# Patient Record
Sex: FEMALE | Race: WHITE | NOT HISPANIC OR LATINO | URBAN - METROPOLITAN AREA
[De-identification: names, ages, dates, MRNs, and addresses within clinical notes are randomized per-mention and may not be internally consistent; named-entity substitution may affect disease eponyms.]

---

## 2021-04-28 ENCOUNTER — PATIENT MESSAGE (OUTPATIENT)
Dept: RESEARCH | Facility: HOSPITAL | Age: 66
End: 2021-04-28

## 2023-05-03 ENCOUNTER — PATIENT MESSAGE (OUTPATIENT)
Dept: RESEARCH | Facility: HOSPITAL | Age: 68
End: 2023-05-03

## 2024-04-16 ENCOUNTER — TELEPHONE (OUTPATIENT)
Dept: NEUROLOGY | Facility: CLINIC | Age: 69
End: 2024-04-16

## 2024-04-16 NOTE — TELEPHONE ENCOUNTER
----- Message from Rebecca Barajas sent at 4/16/2024  4:34 PM CDT -----  Type: Appointment Request     Name of Caller: Casey (son)    When is the first available appointment? Do not have access    Reason for Visit:  Parkinson...  New pt     Best Call Back Number: [phone]    Additional Information:

## 2024-04-17 NOTE — TELEPHONE ENCOUNTER
This patient was seen in [place], [place], and [place].  I think they want to come and discuss DBS. PRISCILLA Rodriguez

## 2024-04-18 ENCOUNTER — TELEPHONE (OUTPATIENT)
Dept: NEUROLOGY | Facility: CLINIC | Age: 69
End: 2024-04-18

## 2024-06-05 ENCOUNTER — TELEPHONE (OUTPATIENT)
Dept: NEUROLOGY | Facility: CLINIC | Age: 69
End: 2024-06-05

## 2024-06-05 NOTE — TELEPHONE ENCOUNTER
----- Message from Suzan Manley sent at 4/17/2024 10:13 AM CDT -----  Regarding: sooner appt  Dr Sunita Tellez is referring this pt to see you for a appt sooner than June Dx dizziness/abnormal image    Plz ctc pt to schedule appt.     The referral/records in media     Thank you,  Suzan Manley   [facility]

## 2024-07-19 ENCOUNTER — TELEPHONE (OUTPATIENT)
Dept: NEUROLOGY | Facility: CLINIC | Age: 69
End: 2024-07-19
Payer: COMMERCIAL

## 2024-07-19 NOTE — TELEPHONE ENCOUNTER
----- Message from Vi Estrada MD sent at 7/18/2024 11:12 AM CDT -----  Regarding: RE: referral from Zachary young  I'd love to see her as a virtual initially and let her keep the OCT date  Can se manage that?  ----- Message -----  From: Vi Ramirez RN  Sent: 7/17/2024   4:50 PM CDT  To: Vi Estrada MD; Natalie Lebron Staff  Subject: referral from Zachary young                      Hi Dr Estrada,    We got a message from the system /  of [facility] [place] [place] asking to get this pt in sooner. She is currently booked October 1.    Can we talk about getting her seen sooner than later? Would you be able to add her on one day in the next couple weeks?    Thanks,    Vi

## 2024-07-22 ENCOUNTER — TELEPHONE (OUTPATIENT)
Dept: NEUROLOGY | Facility: CLINIC | Age: 69
End: 2024-07-22
Payer: COMMERCIAL

## 2024-07-22 NOTE — TELEPHONE ENCOUNTER
----- Message from Vi Ramirez RN sent at 7/19/2024 10:15 AM CDT -----  Regarding: RE: referral from Zachary quinner  Please let me know when you schedule/speak with pt's dtrSvitlana, [phone].     Thanks so much!  ----- Message -----  From: Jennifer López RN  Sent: 7/19/2024   9:44 AM CDT  To: Vi Ramirez RN  Subject: RE: referral from Zachary young                  Thank you  ----- Message -----  From: Vi Ramirez RN  Sent: 7/19/2024   9:04 AM CDT  To: Jennifer López RN  Subject: FW: referral from Zachary young                  Good morningJennifer,    Here is the VIP that needs the virtual ASAP.    Thanks,    Vi  ----- Message -----  From: Vi Estrada MD  Sent: 7/18/2024  11:13 AM CDT  To: Vi Ramirez RN  Subject: RE: referral from Zachary young                  I'd love to see her as a virtual initially and let her keep the OCT date  Can se manage that?  ----- Message -----  From: Vi Ramirez RN  Sent: 7/17/2024   4:50 PM CDT  To: Vi Estrada MD; Natalie Lebron Staff  Subject: referral from Zachary young                      Hi Dr Estrada,    We got a message from the system /  of [facility] in sooner. She is currently booked October 1.    Can we talk about getting her seen sooner than later? Would you be able to add her on one day in the next couple weeks?    Thanks,    Vi

## 2024-07-22 NOTE — TELEPHONE ENCOUNTER
Called daughter, Svitlana regarding PD care for Allie.  Informed her she is scheduled for August 8th at 3 pm    She reportes Allie was evaluated at [facility] for DBS They told her the memory issues prohibited her from getting DBS.    Uploaded records from Care Everywhere.    Svitlana reports she is taking Rytary every two hours. On 17 meds or more.  Would like memory evaluation. Was taking a memory pill, but had to stop due to diarrhea    Taking anxiety meds as well.   Started with  In [place] but was dropped when she went to [place].    Saw doctor in [place] at  most recently.    All facilities have Epic

## 2024-07-22 NOTE — TELEPHONE ENCOUNTER
Attempted to call pt's daughter at [phone]. Left message with request to return call. Informed her that appointment is Aug 8 at 3 pm.

## 2024-07-22 NOTE — TELEPHONE ENCOUNTER
----- Message from Vi Ramirez RN sent at 7/19/2024 10:15 AM CDT -----  Regarding: RE: referral from Zachary quinner  Please let me know when you schedule/speak with pt's dtrSvitlana, [phone].     Thanks so much!  ----- Message -----  From: Jennifer López RN  Sent: 7/19/2024   9:44 AM CDT  To: Vi Ramirez RN  Subject: RE: referral from Zachary young                  Thank you  ----- Message -----  From: Vi Ramirez RN  Sent: 7/19/2024   9:04 AM CDT  To: Jennifer López RN  Subject: FW: referral from Zachary young                  Good morningJennifer,    Here is the VIP that needs the virtual ASAP.    Thanks,    Vi  ----- Message -----  From: Vi Estrada MD  Sent: 7/18/2024  11:13 AM CDT  To: Vi Ramirez RN  Subject: RE: referral from Zachary young                  I'd love to see her as a virtual initially and let her keep the OCT date  Can se manage that?  ----- Message -----  From: Vi Ramirez RN  Sent: 7/17/2024   4:50 PM CDT  To: Vi Estrada MD; Natalie Lebron Staff  Subject: referral from Zachary young                      Hi Dr Estrada,    We got a message from the system /  of [facility] asking to get this pt in sooner. She is currently booked October 1.    Can we talk about getting her seen sooner than later? Would you be able to add her on one day in the next couple weeks?    Thanks,    Vi

## 2024-08-08 ENCOUNTER — OFFICE VISIT (OUTPATIENT)
Dept: NEUROLOGY | Facility: CLINIC | Age: 69
End: 2024-08-08
Payer: COMMERCIAL

## 2024-08-08 ENCOUNTER — LAB VISIT (OUTPATIENT)
Dept: LAB | Facility: HOSPITAL | Age: 69
End: 2024-08-08
Attending: PSYCHIATRY & NEUROLOGY
Payer: MEDICARE

## 2024-08-08 DIAGNOSIS — R41.3 OTHER AMNESIA: ICD-10-CM

## 2024-08-08 DIAGNOSIS — R41.3 MEMORY CHANGE: ICD-10-CM

## 2024-08-08 DIAGNOSIS — R41.3 MEMORY CHANGE: Primary | ICD-10-CM

## 2024-08-08 DIAGNOSIS — G20.B2 PARKINSON'S DISEASE WITH DYSKINESIA AND FLUCTUATING MANIFESTATIONS: ICD-10-CM

## 2024-08-08 LAB
CREAT SERPL-MCNC: 0.7 MG/DL (ref 0.5–1.4)
EST. GFR  (NO RACE VARIABLE): >60 ML/MIN/1.73 M^2
TSH SERPL DL<=0.005 MIU/L-ACNC: 0.51 UIU/ML (ref 0.4–4)

## 2024-08-08 PROCEDURE — 82607 VITAMIN B-12: CPT | Performed by: PSYCHIATRY & NEUROLOGY

## 2024-08-08 PROCEDURE — 82565 ASSAY OF CREATININE: CPT | Performed by: PSYCHIATRY & NEUROLOGY

## 2024-08-08 PROCEDURE — 84443 ASSAY THYROID STIM HORMONE: CPT | Performed by: PSYCHIATRY & NEUROLOGY

## 2024-08-08 PROCEDURE — 99999 PR PBB SHADOW E&M-EST. PATIENT-LVL III: CPT | Mod: PBBFAC,,, | Performed by: PSYCHIATRY & NEUROLOGY

## 2024-08-08 PROCEDURE — 99205 OFFICE O/P NEW HI 60 MIN: CPT | Mod: S$GLB,,, | Performed by: PSYCHIATRY & NEUROLOGY

## 2024-08-08 PROCEDURE — 84425 ASSAY OF VITAMIN B-1: CPT | Performed by: PSYCHIATRY & NEUROLOGY

## 2024-08-08 PROCEDURE — 86592 SYPHILIS TEST NON-TREP QUAL: CPT | Performed by: PSYCHIATRY & NEUROLOGY

## 2024-08-08 PROCEDURE — 1159F MED LIST DOCD IN RCRD: CPT | Mod: CPTII,S$GLB,, | Performed by: PSYCHIATRY & NEUROLOGY

## 2024-08-08 RX ORDER — GABAPENTIN 100 MG/1
100 CAPSULE ORAL 3 TIMES DAILY
COMMUNITY
Start: 2024-05-08

## 2024-08-08 RX ORDER — VENLAFAXINE HYDROCHLORIDE 150 MG/1
150 CAPSULE, EXTENDED RELEASE ORAL DAILY
COMMUNITY
Start: 2024-08-05

## 2024-08-08 RX ORDER — LEVODOPA AND CARBIDOPA 145; 36.25 MG/1; MG/1
1 CAPSULE, EXTENDED RELEASE ORAL DAILY
Qty: 90 CAPSULE | Refills: 12 | Status: SHIPPED | OUTPATIENT
Start: 2024-08-08

## 2024-08-08 RX ORDER — METOPROLOL SUCCINATE 50 MG/1
1 TABLET, EXTENDED RELEASE ORAL DAILY
COMMUNITY
Start: 2024-01-25

## 2024-08-08 RX ORDER — POLYETHYLENE GLYCOL 3350 17 G/17G
17 POWDER, FOR SOLUTION ORAL DAILY
COMMUNITY

## 2024-08-08 RX ORDER — BACLOFEN 5 MG/1
5 TABLET ORAL 3 TIMES DAILY
Qty: 270 TABLET | Refills: 0 | Status: SHIPPED | OUTPATIENT
Start: 2024-08-08 | End: 2024-11-06

## 2024-08-08 RX ORDER — MIRTAZAPINE 15 MG/1
15 TABLET, FILM COATED ORAL NIGHTLY
COMMUNITY

## 2024-08-08 RX ORDER — ACETAMINOPHEN 500 MG
1 TABLET ORAL EVERY MORNING
COMMUNITY

## 2024-08-08 RX ORDER — CELECOXIB 100 MG/1
100 CAPSULE ORAL 2 TIMES DAILY
COMMUNITY

## 2024-08-08 RX ORDER — HYDROCODONE BITARTRATE AND ACETAMINOPHEN 5; 325 MG/1; MG/1
1 TABLET ORAL EVERY 12 HOURS PRN
COMMUNITY

## 2024-08-08 RX ORDER — ACETAMINOPHEN 500 MG
50000 TABLET ORAL DAILY
COMMUNITY

## 2024-08-08 RX ORDER — AMANTADINE HYDROCHLORIDE 100 MG/1
100 TABLET ORAL DAILY
Qty: 30 TABLET | Refills: 11 | Status: SHIPPED | OUTPATIENT
Start: 2024-08-08 | End: 2025-08-08

## 2024-08-08 RX ORDER — LEVODOPA AND CARBIDOPA 95; 23.75 MG/1; MG/1
2 CAPSULE, EXTENDED RELEASE ORAL
Qty: 1080 CAPSULE | Refills: 12 | Status: SHIPPED | OUTPATIENT
Start: 2024-08-08

## 2024-08-08 RX ORDER — LORAZEPAM 0.5 MG/1
1 TABLET ORAL NIGHTLY
COMMUNITY
Start: 2024-07-22

## 2024-08-08 RX ORDER — LUBIPROSTONE 24 UG/1
24 CAPSULE ORAL 2 TIMES DAILY WITH MEALS
COMMUNITY

## 2024-08-08 RX ORDER — AMLODIPINE BESYLATE 2.5 MG/1
2.5 TABLET ORAL DAILY
COMMUNITY
Start: 2024-06-13

## 2024-08-08 RX ORDER — ACETAMINOPHEN 500 MG
5000 TABLET ORAL DAILY
COMMUNITY

## 2024-08-09 LAB — RPR SER QL: NORMAL

## 2024-08-12 ENCOUNTER — TELEPHONE (OUTPATIENT)
Dept: NEUROLOGY | Facility: CLINIC | Age: 69
End: 2024-08-12
Payer: MEDICARE

## 2024-08-12 LAB — VIT B12 SERPL-MCNC: 486 NG/L (ref 180–914)

## 2024-08-12 NOTE — TELEPHONE ENCOUNTER
----- Message from Leland Bliss Jr., MA sent at 8/9/2024  1:17 PM CDT -----  Regarding: FW: Refill  Contact: [phone]    ----- Message -----  From: Emma Brooke  Sent: 8/9/2024  11:54 AM CDT  To: Natalie Lebron Staff  Subject: Refill                                           [facility] calling in reference to medication: amantadine  mg Tab. Pharmacy would like to know if they can switch to the capsules instead of tablets. Please give pharmacy a call.     [facility] - [place], [place] - [address]  [address] [address]  Phone: [phone] Fax: [phone]

## 2024-08-14 LAB — VIT B1 BLD-MCNC: 68 UG/L (ref 38–122)

## 2024-08-20 ENCOUNTER — PATIENT MESSAGE (OUTPATIENT)
Dept: NEUROLOGY | Facility: CLINIC | Age: 69
End: 2024-08-20
Payer: MEDICARE

## 2024-08-29 ENCOUNTER — PATIENT MESSAGE (OUTPATIENT)
Dept: NEUROLOGY | Facility: CLINIC | Age: 69
End: 2024-08-29
Payer: MEDICARE

## 2024-08-29 DIAGNOSIS — F41.8 TEST ANXIETY: ICD-10-CM

## 2024-08-29 DIAGNOSIS — G20.B2 PARKINSON'S DISEASE WITH DYSKINESIA AND FLUCTUATING MANIFESTATIONS: Primary | ICD-10-CM

## 2024-08-30 RX ORDER — RIVASTIGMINE TARTRATE 1.5 MG/1
1.5 CAPSULE ORAL 2 TIMES DAILY
Qty: 60 CAPSULE | Refills: 11 | Status: SHIPPED | OUTPATIENT
Start: 2024-08-30 | End: 2025-08-30

## 2024-08-30 RX ORDER — LORAZEPAM 0.5 MG/1
0.5 TABLET ORAL ONCE
Status: CANCELLED | OUTPATIENT
Start: 2024-08-30

## 2024-08-30 NOTE — TELEPHONE ENCOUNTER
Received question regarding starting Exelon for pt per previous discussion.  She has also requested low dose ativan for anxiety related to MRI.    Responded to Trov message and pended Rx's for MD to review and approve if indicated

## 2024-09-13 ENCOUNTER — TELEPHONE (OUTPATIENT)
Dept: NEUROLOGY | Facility: CLINIC | Age: 69
End: 2024-09-13
Payer: MEDICARE
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
## 2024-09-18 ENCOUNTER — HOSPITAL ENCOUNTER (OUTPATIENT)
Dept: RADIOLOGY | Facility: HOSPITAL | Age: 69
Discharge: HOME | End: 2024-09-18
Attending: PSYCHIATRY & NEUROLOGY
Payer: MEDICARE

## 2024-09-18 DIAGNOSIS — R41.3 OTHER AMNESIA: ICD-10-CM

## 2024-09-18 PROCEDURE — 70553 MRI BRAIN STEM W/O & W/DYE: CPT | Mod: 26,,, | Performed by: RADIOLOGY

## 2024-09-18 PROCEDURE — 25500020 PHARM REV CODE 255: Performed by: PSYCHIATRY & NEUROLOGY

## 2024-09-18 PROCEDURE — A9585 GADOBUTROL INJECTION: HCPCS | Performed by: PSYCHIATRY & NEUROLOGY

## 2024-09-18 PROCEDURE — 70553 MRI BRAIN STEM W/O & W/DYE: CPT | Mod: TC

## 2024-09-18 RX ORDER — GADOBUTROL 604.72 MG/ML
7 INJECTION INTRAVENOUS
Status: COMPLETED | OUTPATIENT
Start: 2024-09-18 | End: 2024-09-18

## 2024-09-18 RX ADMIN — GADOBUTROL 7 ML: 604.72 INJECTION INTRAVENOUS at 03:09

## 2024-10-02 ENCOUNTER — TELEPHONE (OUTPATIENT)
Dept: NEUROLOGY | Facility: CLINIC | Age: 69
End: 2024-10-02
Payer: MEDICARE

## 2024-10-02 NOTE — TELEPHONE ENCOUNTER
"OLEG spoke with pt's daughter Svitlana per request of neurologist Dr. Estrada.      [phone] daughter Svitlana Shane reported the following as primary concern:  Pt is refusing medications or hides them, pretends to swallow but spits out when no one is looking. Pt is taking "almost 30 pills" a day. Pt has a nurse and near 24 hour caregivers present.     SW provided education around major cognitive disorders re: medication refusal, becoming fearful and confused, no amount of reasoning or arguing with pt may be helpful.    OLEG discussed converting medications, where possible, to liquid/ dissolvable form so less pills, able to put in her food or drink to avoid confrontation.     OLEG called pt's Nurse Karon [phone] at Svitlana's request. Karon reported that pt will refuse medications on occasions, especially with newer caregivers that she is unfamiliar with, but redirection and re-approaching later works. Karon relayed that pt's bx and health have been relatively better so they do not want to change the medication regimen, but pt will chew on pills so Dissolvable is better- easier for pill dispenser, which team of caregivers and  rely on when nurse is not there. Liquid would be difficult for others to administer regularly.     OLEG will send request to providers   José Miguel Mcfarland (psych)  Malou Conner (pain mgmt)   Alexander (cardiologist)  Nohemi Bustillos NP ([facility] )    Luz Elena Lott, [facility]  She/ Her    ? Neurology ? Movement Disorders  [facility] ? Main [address] [address]   P: [phone]  F: [phone]      "

## 2024-10-04 ENCOUNTER — PATIENT MESSAGE (OUTPATIENT)
Dept: NEUROLOGY | Facility: CLINIC | Age: 69
End: 2024-10-04
Payer: MEDICARE

## 2024-11-05 ENCOUNTER — TELEPHONE (OUTPATIENT)
Dept: NEUROLOGY | Facility: CLINIC | Age: 69
End: 2024-11-05
Payer: MEDICARE

## 2024-11-05 RX ORDER — BACLOFEN 5 MG/1
5 TABLET ORAL 3 TIMES DAILY
Qty: 270 TABLET | Refills: 0 | Status: SHIPPED | OUTPATIENT
Start: 2024-11-05 | End: 2025-02-03

## 2024-11-05 NOTE — TELEPHONE ENCOUNTER
Called patient today to inform them that they need to reschedule there virtual visit for Friday due to the provider being out of office. PT daughter . PT has been schedule 12/30 at 8:00am.

## 2024-12-03 ENCOUNTER — TELEPHONE (OUTPATIENT)
Dept: NEUROLOGY | Facility: CLINIC | Age: 69
End: 2024-12-03
Payer: MEDICARE

## 2024-12-03 NOTE — TELEPHONE ENCOUNTER
Called Pt to offer them an appointment on December 6th at 8am. PT daughter picks up and has accepted the appointment.

## 2024-12-06 ENCOUNTER — OFFICE VISIT (OUTPATIENT)
Dept: NEUROLOGY | Facility: CLINIC | Age: 69
End: 2024-12-06
Payer: MEDICARE

## 2024-12-06 DIAGNOSIS — F03.90 DEMENTIA, UNSPECIFIED DEMENTIA SEVERITY, UNSPECIFIED DEMENTIA TYPE, UNSPECIFIED WHETHER BEHAVIORAL, PSYCHOTIC, OR MOOD DISTURBANCE OR ANXIETY: ICD-10-CM

## 2024-12-06 DIAGNOSIS — G20.B2 PARKINSON'S DISEASE WITH DYSKINESIA AND FLUCTUATING MANIFESTATIONS: ICD-10-CM

## 2024-12-06 DIAGNOSIS — R41.3 MEMORY CHANGE: Primary | ICD-10-CM

## 2024-12-06 NOTE — ASSESSMENT & PLAN NOTE
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

## 2024-12-06 NOTE — PROGRESS NOTES
"The patient location is: HOME  The chief complaint leading to visit is: iPD  1. Memory change  Ambulatory consult to Neuropsychology      2. Dementia, unspecified dementia severity, unspecified dementia type, unspecified whether behavioral, psychotic, or mood disturbance or anxiety        3. Parkinson's disease with dyskinesia and fluctuating manifestations          Visit type: Virtual visit with synchronous audio and video    Face to Face time with patient: 20mins  20 minutes of total time spent on the encounter, which includes face to face time and non-face to face time preparing to see the patient (eg, review of tests), Obtaining and/or reviewing separately obtained history, Documenting clinical information in the electronic or other health record, Independently interpreting results (not separately reported) and communicating results to the patient/family/caregiver, or Care coordination (not separately reported).     Each patient to whom he or she provides medical services by telemedicine is:  (1) informed of the relationship between the physician and patient and the respective role of any other health care provider with respect to management of the patient; and (2) notified that he or she may decline to receive medical services by telemedicine and may withdraw from such care at any time.      MOVEMENT DISORDERS CLINIC    PCP/Referring Provider: No referring provider defined for this encounter.  Date of Service: 12/6/2024    Chief Complaint: Pdism    Interval Hx    Since last visit,   Decreased baclofen 5mg TID  Dyskinesias lowered    Takes   Rytary 145mg 6 QAM  Otherwise 2 caps 95mg q2.5H  1 95mg at 9pm    Seems to have anxiety at 1.5 hrs after doses    "priorHPI: Michelle David is a R HANDED 69 y.o. female with a medical issues significant for Lspine dz s/p surgery, iPD coming for pt establishment. She noted 2016 L side tremors and stiffness. At that point mobile. 2022 still mobile. Started falling 2022 and " "needed a walker end of 2022 still able to walk a quarter mile. At this point she can walk 100 feet. Falls 3 times a year. Started having dyskinesias 3-4 years ago. 4 months ago notable increase to dyskinesias. 70% of the day with modest dyskiensias preventing her walking.  Has occasional L leg dystonia.    Saw a  In [place], then [facility], then [facility], [place] [facility] [place].  Had genetic testing - PDgeneration - negative  Has a cousin with PDism  Brother with PDism    Failed PT - uncooperative    Memory is poor- comes and goes - simple conversations  Hallucinated children on the porch  Someparanoia    Has sitters year round    Medication history:  -Taking  [id number] QAM  Otherwise 2 caps 95mg q2.5H  Failed aricept - had diarrhea    Neuroleptic exposure:  None    Has incontinence    PD Review of Symptoms:  Anosmia: yes  Dysarthria/Hypophonia: yes  Dysphagia/Sialorrhea: mild  Depression: none  Cognitive slowing: poor  Hallucinations: yes  Urinary changes: -  Constipation: yes  Orthostasis: none  Dyskinesia: severe  Falls: yes  Freezing: none  Micrographia: yes  Sleep issues:  -RBD: none"    Review of Systems:   Review of Systems   Constitutional:  Negative for fever.   HENT:  Negative for congestion.    Eyes:  Negative for double vision.   Respiratory:  Negative for cough and shortness of breath.    Cardiovascular:  Negative for chest pain and leg swelling.   Gastrointestinal:  Negative for nausea.   Genitourinary:  Negative for dysuria.   Musculoskeletal:  Positive for falls.   Skin:  Negative for rash.   Neurological:  Positive for tremors and speech change. Negative for headaches.   Psychiatric/Behavioral:  Positive for hallucinations and memory loss. Negative for depression.          Current Medications:  Outpatient Encounter Medications as of 12/6/2024   Medication Sig Dispense Refill    ACETAMINOPHEN ORAL Take 1 tablet by mouth once daily.      amantadine  mg Tab Take 1 tablet (100 mg total) " by mouth once daily. 30 tablet 11    amLODIPine (NORVASC) 2.5 MG tablet Take 2.5 mg by mouth once daily.      baclofen (LIORESAL) 5 mg Tab tablet TAKE 1 TABLET (5 MG TOTAL) BY MOUTH 3 (THREE) TIMES DAILY. 270 tablet 0    carbidopa-levodopa (RYTARY) 23.75-95 mg CpSR Take 2 capsules by mouth 6 (six) times daily. 1080 capsule 12    carbidopa-levodopa (RYTARY) 36. mg CpSR Take 1 capsule by mouth once daily. 90 capsule 12    celecoxib (CELEBREX) 100 MG capsule Take 100 mg by mouth 2 (two) times a day.      cholecalciferol, vitamin D3, 125 mcg (5,000 unit) Tab Take 1 tablet by mouth every morning.      cholecalciferol, vitamin D3, 125 mcg (5,000 unit) Tab Take 5,000 Units by mouth once daily.      cranberry fruit concentrate (AZO CRANBERRY ORAL) Take by mouth 2 (two) times a day.      FA/mv,Ca,iron,min/lycopene/lut (MULTIVITAL ORAL) Take by mouth once daily.      gabapentin (NEURONTIN) 100 MG capsule Take 100 mg by mouth 3 (three) times daily.      HYDROcodone-acetaminophen (NORCO) 5-325 mg per tablet Take 1 tablet by mouth every 12 (twelve) hours as needed for Pain.      Lactobacillus acidophilus (PROBIOTIC) 10 billion cell Cap Take 50,000 Units by mouth once daily.      LORazepam (ATIVAN) 0.5 MG tablet Take 1 mg by mouth every evening.      lubiprostone (AMITIZA) 24 MCG Cap Take 24 mcg by mouth 2 (two) times daily with meals.      metoprolol succinate (TOPROL-XL) 50 MG 24 hr tablet Take 1 tablet by mouth once daily.      mirtazapine (REMERON) 15 MG tablet Take 15 mg by mouth every evening.      polyethylene glycol (GLYCOLAX) 17 gram/dose powder Take 17 g by mouth once daily.      rivastigmine tartrate (EXELON) 1.5 MG capsule Take 1 capsule (1.5 mg total) by mouth 2 (two) times daily. 60 capsule 11    venlafaxine (EFFEXOR-XR) 150 MG Cp24 Take 150 mg by mouth once daily.       No facility-administered encounter medications on file as of 12/6/2024.       Past Medical History:  Patient Active Problem List   Diagnosis     Parkinson's disease with dyskinesia and fluctuating manifestations    Memory change    Dementia, unspecified dementia severity, unspecified dementia type, unspecified whether behavioral, psychotic, or mood disturbance or anxiety       Past Surgical History:  No past surgical history on file.    Social:  Social History     Socioeconomic History    Marital status:      Social Drivers of Health     Financial Resource Strain: Low Risk  (8/2/2024)    Overall Financial Resource Strain (CARDIA)     Difficulty of Paying Living Expenses: Not hard at all   Food Insecurity: No Food Insecurity (8/2/2024)    Hunger Vital Sign     Worried About Running Out of Food in the Last Year: Never true     Ran Out of Food in the Last Year: Never true   Transportation Needs: No Transportation Needs (6/9/2023)    [facility]     Lack of Transportation (Medical): No     Lack of Transportation (Non-Medical): No   Physical Activity: Inactive (8/2/2024)    Exercise Vital Sign     Days of Exercise per Week: 0 days     Minutes of Exercise per Session: 0 min   Stress: Stress Concern Present (8/2/2024)    [employer] - Occupational Stress Questionnaire     Feeling of Stress : To some extent   Housing Stability: Unknown (8/2/2024)    Housing Stability Vital Sign     Unable to Pay for Housing in the Last Year: No       Family History:  No family history on file.    PHYSICAL:  There were no vitals taken for this visit.    Physical Exam  Constitutional: Well-developed, well-nourished, appears stated age  Eyes: No scleral icterus  ENT: Moist oral mucosa  Cardiovascular: No lower extremity edema   Respiratory: No labored breathing   Skin: No rash   Hematologic: No bruising    Other: GI/ deferred   Mental status: Alert and oriented to person, place, time, and situation;   follows commands  Speech: normal (not dysarthric), no aphasia  Cranial nerves:            CN II: Pupils  mid-position and equal, not tested light or accommodation  CN III, IV, VI: Extraocular movements full, no nystagmus visualized  CN V: Not tested   CN VII: Face strong and symmetric bilaterally   CN VIII: Hearing intact to voice and conversation   CN IX, X: Palate raises midline and symmetric   CN XI: Strong shoulder shrug B/L  CN XII: Tongue appears midline   Motor: Normal bulk by appearance, no drift   Sensory: Not tested    Gait: Not tested  Deep tendon reflexes: Not tested  Movement/Coordination                    mod hypophonic speech.                     mod facial masking.  mod bradykinesia.                   No tremor with rest, posture, kinesis, or intention.                     No other dystonia, chorea, athetosis, myoclonus, or tics visualized.      General Medical Examination:  General: Good hygiene, appropriate appearance.  HEENT: Normocephalic, atraumatic.   Neck: Supple.   Chest: Unlabored breathing.   CV: Symmetric pulses.   Ext: No clubbing, cyanosis, or edema.     Mental Status:  Mood/Affect: Appropriate/congruent.  Level of consciousness: Awake, alert.  Orientation: Not oriented to hospital floor or president  Some mildly coherent  Language: No Dysarthria    Cranial nerves:  I: Not tested  II: PERRL, VFF to counting  III, IV, VI: EOMI with conjugate gaze and no nystagmus on end gaze  V: Facial sensation intact and symmetric over the bilateral V1-V3  VII: Facial muscle activation intact and symmetric over the bilateral upper and lower face  VIII: Hearing intact in the b/l ears and symmetrical to finger rub  IX, X, XII: TUP midline - no atrophy or fasiculations  X: SCMs and shoulder shrug full strength b/l and symmetric    Motor:   -UE: 5/5 deltoids; 5/5 biceps, triceps; 5/5 wrist flexors, extensors; 5/5 interosseous; 5/5   -LEs: 5/5 hip flexion, extension; 5/5 knee flexion, extension; 5/5 ankle flexion, extension    Mod hypomimia  Severe ballistic chorea    DTRs:  ? Biceps Triceps  "Brachioradialis Knee Ankle   Left 2+ 2+ 2+ 2+    Right 2+ 2+ 2+ 2+      Cannot perform finger taps consistently    Neck tone: nl  ? Arm Leg   Left 1+ 1+   Right 1+ 1+     Sensation:   -Light touch: Intact and symmetric in the bilateral upper and lower extremities.    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  Ontime is very short causing issues  Takes  [id number] QAM  Otherwise 2 caps 95mg q2.5H    Suggsted start replacing doses of 2 tabs 95mg rytary with 1 tab 145mg slowly to see if ontime imporves           Memory change - Primary    Relevant Orders    Ambulatory consult to Neuropsychology    Dementia, unspecified dementia severity, unspecified dementia type, unspecified whether behavioral, psychotic, or mood disturbance or anxiety    Current Assessment & Plan         Continue excelon as above            This visit covered ongoing complex medical needs extending over multiple office visits covering multiple chronic issues.      Vi Estrada MD, MS  [facility]  Department of Neurology  Movement Disorders        "

## 2024-12-09 ENCOUNTER — PATIENT MESSAGE (OUTPATIENT)
Dept: NEUROLOGY | Facility: CLINIC | Age: 69
End: 2024-12-09
Payer: MEDICARE

## 2024-12-16 ENCOUNTER — TELEPHONE (OUTPATIENT)
Dept: NEUROLOGY | Facility: CLINIC | Age: 69
End: 2024-12-16
Payer: MEDICARE

## 2024-12-16 NOTE — TELEPHONE ENCOUNTER
Called PT to schedule there F/U virtual appt with the provider. PT picks up she prefers 10am to 1pm. PT has been schedule for 3/3/25 at 10:00am for a virtual.

## 2024-12-19 ENCOUNTER — PATIENT MESSAGE (OUTPATIENT)
Dept: NEUROLOGY | Facility: CLINIC | Age: 69
End: 2024-12-19
Payer: MEDICARE

## 2025-01-08 ENCOUNTER — TELEPHONE (OUTPATIENT)
Dept: NEUROLOGY | Facility: CLINIC | Age: 70
End: 2025-01-08
Payer: MEDICARE

## 2025-01-14 ENCOUNTER — PATIENT MESSAGE (OUTPATIENT)
Facility: CLINIC | Age: 70
End: 2025-01-14
Payer: MEDICARE

## 2025-01-21 ENCOUNTER — OFFICE VISIT (OUTPATIENT)
Dept: NEUROLOGY | Facility: CLINIC | Age: 70
End: 2025-01-21
Payer: MEDICARE

## 2025-01-21 DIAGNOSIS — G20.B2 PARKINSON'S DISEASE WITH DYSKINESIA AND FLUCTUATING MANIFESTATIONS: ICD-10-CM

## 2025-01-21 DIAGNOSIS — F03.90 DEMENTIA, UNSPECIFIED DEMENTIA SEVERITY, UNSPECIFIED DEMENTIA TYPE, UNSPECIFIED WHETHER BEHAVIORAL, PSYCHOTIC, OR MOOD DISTURBANCE OR ANXIETY: Primary | ICD-10-CM

## 2025-01-21 NOTE — PROGRESS NOTES
NEUROPSYCHOLOGY CONSULT (TELEHEALTH)  Referral Information  Name: Michelle David  MRN: [id number]  : 1955  GAVIN: 25  Age: 69 y.o.  Race: White  Gender: female  Referring Provider: Vi Estrada MD  Referral Diagnoses: Memory change [R41.3]    Order comments: Memroy baseline Screen for PD plus given memory decline   [place]    Telemedicine:   The patient location is: [place]  The provider location is: [place]  Total time spent with patient: 60 minutes  The chief complaint leading to consultation/medical necessity is:    Visit type: Virtual visit with synchronous audio and video    Each patient to whom I provide medical services by telemedicine is:  (1) informed of the relationship between the physician and patient and the respective role of any other health care provider with respect to management of the patient; and (2) notified that they may decline to receive medical services by telemedicine and may withdraw from such care at any time. Patient verbally consented to receive this service via voice-only telephone call.    Consent/Emergency Plan: The patient's caregivers expressed an understanding of the purpose of the evaluation and consented to all procedures, including providing consent for Fallon Castano Psy.D., a clinical neuropsychology fellow under the supervision of  Karen Lemus Psy.D., to be involved in her care. Due to limited verbal ability and comprehension observed during intake, Ms. Reynaga provided assent. We discussed the limits of confidentiality and discussed an emergency plan. She additionally provided assent to speak with her daughter and , who were present during the clinical interview.    ASSESSMENT & PLAN:   Ms. Michelle David is a 69 y.o., female with medical history including dementia 2/2 idiopathic Parkinson's Disease (onset ), atrial fibrillation, anxiety, depression, and family history of parkinsonism who was referred for a neuropsychological  evaluation. History was primarily provided by daughter and pt's . They report onset of physical symptoms starting in 2016. Declines in cognition were noticed 2-3 years ago but suspected to have started earlier. Pt completed neuropsychological evaluation a couple years ago at the [facility], dtr will send us the report. Today Ms. David is largely nonverbal, but her  reports sudden instances of improved cognition lasting minutes. She is dependent for all iADLs and ADLs. Family reports restlessness, hallucinations, paranoia, and compulsive behaviors. Sleep is good though she snores and has untreated sleep apnea. She uses a walker and has increased falls. Family has sitters and locks/alarms on doors. Most-recent brain MRI showed mild chronic ischemic changes, general volume loss, and hippocampal volume loss. Reversible labs are WNL. Anticholinergic burden is elevated (4; celecoxib, Ativan, Mirtazapine, Venlafaxine). The family would like to be connected with caregiver support services to help them cope with the progression of Ms. David's symptoms and prepare for end-of-life care. We will also discuss referral to palliative care during feedback.    Ms. David will be scheduled for testing.    1. Dementia, unspecified dementia severity, unspecified dementia type, unspecified whether behavioral, psychotic, or mood disturbance or anxiety    2. Parkinson's disease with dyskinesia and fluctuating manifestations      Thank you for allowing us to participate in Ms. David's care.  If you have any questions, please contact Dr. Lemus at [phone].     Fallon Castano Psy.D.  Postdoctoral Fellow in Clinical Neuropsychology  [facility] - Department of Neurology    Karen Lemus Psy.D.   Board Certified in Clinical Neuropsychology /or Licensed Clinical Psychologist/Neuropsychologist  [facility] - Department of Neurology    CLINICAL INTERVIEW & RECORD REVIEW:     Michelle David is a  69 y.o. female with a medical history significant for dementia 2/2 iPD, Lspine dz s/p surgery, atrial fibrillation, TIA (2019), anxiety, depression, and family history of parkinsonism referred for neuropsychological evaluation.  She began experiencing tremors and stiffness on her left side in 2016. Over time, her physical symptoms have progressively worsened, and since 2022, she has been falling a few times each year. She has been seen by multiple neurologists in [place], the [facility], and at [facility]. In 2015, she was referred by neurology for a neuropsychological evaluation due to memory concerns. She underwent a brief evaluation and was diagnosed with ADHD and anxiety Family noticed onset of cognitive declines 2-3 years ago though they suspect symptoms had and earlier onset. In 2023, she underwent an evaluation for bilateral STN DBS at the [facility]. However, she was not approved for surgery due to cognitive declines identified during a neuropsychological evaluation. In May 2024 she had two episodes of paranoia leading to initiation of Seroquel. Shortly after she had a syncopal episode and a visit to the ER for sinus tachycardia. She was given fluids and symptoms improved; providers suspected serotonin reaction. In October 2024 she had another episode of paranoia, left home unsupervised, and was found by a neighbor. When she returned her blood pressure was found to be 70/40; she was given fluids at home and BP normalized the next day. She is being followed by neurology for PD with most recent evaluation reporting concerns for falls, ongoing neuropsychiatric symptoms, and progressive declines in cognition.    PD Review of Symptoms assessed by Neurology on 8/8/2024:  Anosmia: yes  Dysarthria/Hypophonia: yes  Dysphagia/Sialorrhea: mild  Depression: none  Cognitive slowing: poor  Hallucinations: yes  Urinary changes: no, ongoing incontinence, using depends  Constipation: yes, also reporting  "fecal incontinence  Orthostasis: none  Dyskinesia: severe  Falls: yes  Freezing: none  Micrographia: yes  Sleep issues: sleeping well; RBD: none    Cognitive Functioning   Previous evaluation(s) & general findings:   [facility] 2-3 years ago: Records show a note in June 2023 reporting "It was requested that the report be forwarded to Dr. Cobos for his review. Her verbal report indicates pt to have decline in executive functioning, decreased ability to store and return new info, poor short-term to long-term memory conversion. Visual memory is better that auditory, although both are poor. She categorizes her as having "profound memory loss" and recommends meds for treatment of dementia." Pt's dtr is planning to send us the report.    Pt saw was seen by Dr. Luis Casarez in 2015 at [facility] in 2015 for a brief evaluation to assess attention versus anxiety. Pt was diagnosed with ADHD and anxiety. She did not undergo formal cognitive testing at the time.     Onset & course of difficulty: [facility] 2-3 years ago and noticed difficult to keep steady conversation (attention and comprehension, started sentence then forgetting what was about)    Examples: Changes observed since [facility] testing completed 2-3 years ago  Attention/Working Memory: Declines in attention  Executive Functioning/Planning: less able to follow conversations and follow short commands. Gets confused with what to do with medication when in her mouth  Processing Speed: Slowed processing speed though she is also significantly impaired in comprehension  Language: Comprehension difficulty, perseverates  Visuospatial: Able to navigate through her home but also receives constant supervision from caregivers. Pt has history of wandering, so family has door alarms/locks.  Learning & Memory: Declines in both learning and memory.     Exacerbating factors: none    Ameliorating factors: none    Daily Functioning    ADLs  Self-Care Eating Safety " "  Dependent  Caregivers try to promote as much independence as possible Independent   Pt has 24 hour care with sitters, nurse, , and daughter.  Using a walker     Instrumental IADLs:   Driving Medication Mgmt/Health Household Mgmt Finances   Dependent  Dependent  Family took over medication management 2 years ago and appts 3 years ago Dependent   Dependent     Physical Symptoms:    Tremor: Dtr reports tremor during medication OFF state; described as minor bouncing and curling off foot.   Neurology Molly Blanchard 2017: "subtle symptoms that have started perhaps 2-3 months ago. She noticed an odd posture in her left arm, decreased arm swing when she walks. She has rarely had a mild resting tremor, which is not visible at all today. Her speech is hypophonic, and she has micrographia, there is probably very little question that this is early Parkinson's. She is left-hand dominant unfortunately."  Difficulty walking: yes, walking around her home with the aid of a rollator and occasionally without it. Neurology reported notable increase to dyskinesias starting 8 months ago. 70% of the day with modest dyskiensias preventing her from walking. She has had 3 or 4 falls recently. Has occasional L leg dystonia.   Imbalance: yes   Falls: yes, dtr reported she started falling 2-3 years ago. Last fall with head injury without LOC in July 2024.   Trouble with fine motor movements: yes, buttoning clothing is difficult  Difficulty swallowing: No  Lightheadedness/Dizziness/Syncope: yes, when stands up  Urinary or Bowel Urgency/Incontinence: yes, has urinary and bowel incontinence; wearing depends  Sensory Sxs: yes, lost smell in 2017  Pain: Dtr reports she does not seem to have much pain, she does sleep with pillow under knees  Physical Exercise Routine: walks around the house    Psychiatric/Neuropsychiatric Symptoms   Mood: Dtr reports mood is labile. She finds pt is sometimes restless. Dtr believes this is result of medication " "and overstimulation.   Depression: Dtr reports "not much recently"  Current Ideation, Intention, or Plan: Could not assess during intake due to limited speech/comprehension. Family and sitters are always present and have not observed any concerning behavior.  Eveline/Hypomania: no  Anxiety: yes per dtr, she is more anxious when grandchildren are present.  Stress: no   Social Withdrawal: no   Neurovegetative Sxs:  Appetite: good, sometimes doesn't want to eat, weight has improved  Sleep: 9PM-6:30AM, wakes up to go to use restroom, starting 1 week ago sitters present at night, no naps  Insomnia: no    Snoring: yes   Apnea: yes, untreated   Acting out dreams: no   Energy: comes and goes  Hallucinations: yes, 1/month, maybe seeing people  Delusional/Paranoid Thinking: yes, fear of people out to get her started 2 years ago. At the same time nursing staff started  Obsessive/Impulsive/Compulsive Behaviors: yes, throwing everything away, hiding things, was hiding mail so dtr rerouted her mail to [facility] house. Until family changed [employer] passwords 2-3 years ago, pt was ordering a lot   Disinhibition: yes, a couple years ago, less so now  Irritability/Agitation: no   Aggression: no   Apathy/Indifference: no   Pseudobulbar Affect: no     RELEVANT HISTORY  Psychiatric History   Prior Diagnoses: Anxiety  History of Trauma/Abuse: no   History of Suicide Attempts: no   Hospitalization(s): no   Psychotherapy/Counseling: Pt saw psychiatrist last week at outside facility for restlessness. Records not available.    Substance Use History   History of abuse/overuse: no   Caffeine black tea 1 cup a day    Neurological History    Headaches/Migraines: no   TBI: yes, dtr reports she had a head injury with possible LOC as a teenager, dtr denies onset or persistence of cognitive symptoms following the injury. Family deny LOC in adulthood.   Seizures: no   Stroke: TIA on 11/11/2019- Admitted for "chest pain, left-sided numbness and weakness with " "reported heart rates in the 140s." "cardiologist was consulted and later that evening she was changed from aspirin to Eliquis 5 mg p.o. twice daily. CT scan of the head was negative. Carotid ultrasound showed 0 to 19% stenosis bilaterally, MRI of the brain did not show an acute stroke, MRA of the brain did not show any aneurysms and was normal. Echo showed a preserved EF of 55 to 60%. Patient's metoprolol was increased from 25 mg to 50 mg"  Tumor: no   Previous Episodes of Delirium: Over a year ago she had a UTI with AMS, dtr reports AMS resolved with treatment of UTI and she returned to her baseline.  Movement Disorder: Parkinson's Disease  PD Review of Symptoms:  ON/OFF state: Pt takes carbidopa-levodopa every 2.5 hours, dtr reports she does not have much off time  Tremor: mild  Gait change: a little unsteady, wide gait, foot drop  Rigidity: when off meds, back and foot  Bradykinesia: no  Masked facies: yes, and presents with surprised affect throughout todays intake  Anosmia: loss of smell in 2017  Dysarthria/Hypophonia: yes  Dysphagia/Drooling: mild   Depression: improved  Cognitive slowing: yes  Fluctuating cognition: yes,  reports she is "sometimes her old self" for 15 minutes or so; they are unsure of a pattern or if in relation to medication ON/OFF state  Hallucinations: yes, has seen people  Impulsivity: yes  Obsessions/Compulsions: yes, hoarding and hiding items  Urinary changes: ongoing urinary incontinence, wearing depends  Constipation: yes per 8/8/24 neurology appt  Orthostasis (OH, drop in BP when stand up): maybe per dtr  Falls: yes, 3-4 a year  Freezing: no per 8/8/24 neurology appt  Micrographia: can't recognize writing maybe gotten bigger  Sleep issues:       -MARBIN: yes, untreated      -RBD: no  Vision:  no reported changes   Multiple Sclerosis: no   CNS Infection: no     Family Neurological & Psychiatric History     Neurologic: Has a cousin with Pdism, Brother with Pdism   Psychiatric: " Negative for heritable risk factors.    Development  Education   Born & raised: [place]  Prenatal and  development: WNL  Developmental milestones: WNL  Language Acquisition: English first language    Level Attained: 16, bachelor's degree in art   Learning/Attention/Behavior Difficulties: Dtr reports possible undiagnosed dyslexia and ADHD. Pt never enjoyed reading possibly due to difficulty, would write incorrectly with letters flipped  Repeated Grade(s): no   Typical Grades: Dtr thinks she generally did well in school        Occupation  Social   Occupational Status: stopped working in her early 30s  Primary Occupation: Taught art, owned [employer]    Family Status: , 2nd marriage    Current Living Situation: lives in home with , 24/hr sitters, and a nurse  Support System: Family, sitters, nurse  Hobbies/Activities: sit outside, be around family       Medical Status   Patient Active Problem List   Diagnosis    Parkinson's disease with dyskinesia and fluctuating manifestations    Memory change    Dementia, unspecified dementia severity, unspecified dementia type, unspecified whether behavioral, psychotic, or mood disturbance or anxiety     Neurodiagnostics     Results for orders placed or performed during the hospital encounter of 24   MRI Brain W WO Contrast    Narrative    EXAMINATION:  MRI BRAIN W WO CONTRAST    CLINICAL HISTORY:  Memory loss; Other amnesia    TECHNIQUE:  Multiplanar multisequence MR imaging of the brain was performed before and after the administration of 7 mL Gadavist intravenous contrast.    COMPARISON:  Report of prior study dated 2023    FINDINGS:  There is some widening of the ventricles out of proportion to the sulci suggesting an element of central volume loss.  No evidence of hydrocephalus or mass effect.  An element of hippocampal volume loss is suspected although this does not appear markedly advanced.    No intracranial hemorrhage or acute  infarct.    Small foci of increased signal within the subcortical and periventricular white matter and vale are nonspecific but may reflect mild chronic small vessel ischemic change.  No evidence of prior cortical/territorial infarct.  No evidence of amyloid angiopathy.    No enhancing intracranial lesion.    Normal arterial flow voids are preserved at the skull base.    The visualized sinuses and mastoid air cells are clear.      Impression    No acute intracranial process or enhancing intracranial lesion.    Element of diffuse volume loss as discussed above.  No evidence of advanced chronic ischemic changes.     MRI BRAIN W WO CONTRAST completed at [facility] on 6/14/2023  Impression  1. No evidence of acute cerebral pathology.  2. 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  3. Subtle increased T2 signal within the globi pallidi bilaterally is nonspecific but may represent  the remote sequela of a toxic or metabolic injury.  4. Spondylosis with spinal canal narrowing at the C3-C4 and C4-C5 level could be more accurately  evaluated with dedicated spinal MRI.    Narrative  EXAM: MR BRAIN WITHOUT AND WITH IV CONTRAST    COMPARISON: Head CT 03/18/2022.    FINDINGS:    The study was performed using pre-DBS/FUS protocol for presurgical planning purposes.    The examination is partially degraded by patient motion artifact.    Scattered nonspecific foci of T2 signal hyperintensity within the cerebral white matter are most  consistent with mild-to-moderate microvascular ischemic disease or leukoaraiosis. Moderate  generalized age-related cerebral volume loss.    Subtle increase in T2 signal hyperintensity within the globi pallidi bilaterally (4:42) may  represent the sequela of chronic toxic or metabolic injury.    The brain is otherwise normal without evidence of mass lesions, abnormal contrast enhancement, or  diffusion restriction. Diffusion tensor imaging obtained for preoperative planning purposes  demonstrate grossly  "normal anisotropy within the major white matter tracts. Normal flow voids are  present within the intracranial vessels.    The paranasal sinuses and mastoid air cells are clear.    Within the partially visualized cervical spine, spondylitic changes cause spinal canal narrowing at  the C2-C3 and C3-C4 levels. This could be more accurately evaluated with dedicated cervical spine  MRI if clinically warranted.    Pertinent Lab Work     Lab Results   Component Value Date    CLQXEWJP30 486 08/08/2024     Lab Results   Component Value Date    RPR Non-reactive 08/08/2024     No results found for: "FOLATE"  Lab Results   Component Value Date    TSH 0.508 08/08/2024     Lab Results   Component Value Date    HGBA1C 5.1 12/29/2023     No results found for: "HIV1X2", "[id number]"    Medications     Current Outpatient Medications:     ACETAMINOPHEN ORAL, Take 1 tablet by mouth once daily., Disp: , Rfl:     amantadine  mg Tab, Take 1 tablet (100 mg total) by mouth once daily., Disp: 30 tablet, Rfl: 11    amLODIPine (NORVASC) 2.5 MG tablet, Take 2.5 mg by mouth once daily., Disp: , Rfl:     baclofen (LIORESAL) 5 mg Tab tablet, TAKE 1 TABLET (5 MG TOTAL) BY MOUTH 3 (THREE) TIMES DAILY., Disp: 270 tablet, Rfl: 0    carbidopa-levodopa (RYTARY) 23.75-95 mg CpSR, Take 2 capsules by mouth 6 (six) times daily., Disp: 1080 capsule, Rfl: 12    carbidopa-levodopa (RYTARY) 36. mg CpSR, Take 1 capsule by mouth once daily., Disp: 90 capsule, Rfl: 12    celecoxib (CELEBREX) 100 MG capsule, Take 100 mg by mouth 2 (two) times a day., Disp: , Rfl:     cholecalciferol, vitamin D3, 125 mcg (5,000 unit) Tab, Take 1 tablet by mouth every morning., Disp: , Rfl:     cholecalciferol, vitamin D3, 125 mcg (5,000 unit) Tab, Take 5,000 Units by mouth once daily., Disp: , Rfl:     cranberry fruit concentrate (AZO CRANBERRY ORAL), Take by mouth 2 (two) times a day., Disp: , Rfl:     FA/mv,Ca,iron,min/lycopene/lut (MULTIVITAL ORAL), Take by mouth " "once daily., Disp: , Rfl:     gabapentin (NEURONTIN) 100 MG capsule, Take 100 mg by mouth 3 (three) times daily., Disp: , Rfl:     HYDROcodone-acetaminophen (NORCO) 5-325 mg per tablet, Take 1 tablet by mouth every 12 (twelve) hours as needed for Pain., Disp: , Rfl:     Lactobacillus acidophilus (PROBIOTIC) 10 billion cell Cap, Take 50,000 Units by mouth once daily., Disp: , Rfl:     LORazepam (ATIVAN) 0.5 MG tablet, Take 1 mg by mouth every evening., Disp: , Rfl:     lubiprostone (AMITIZA) 24 MCG Cap, Take 24 mcg by mouth 2 (two) times daily with meals., Disp: , Rfl:     metoprolol succinate (TOPROL-XL) 50 MG 24 hr tablet, Take 1 tablet by mouth once daily., Disp: , Rfl:     mirtazapine (REMERON) 15 MG tablet, Take 15 mg by mouth every evening., Disp: , Rfl:     polyethylene glycol (GLYCOLAX) 17 gram/dose powder, Take 17 g by mouth once daily., Disp: , Rfl:     rivastigmine tartrate (EXELON) 1.5 MG capsule, Take 1 capsule (1.5 mg total) by mouth 2 (two) times daily., Disp: 60 capsule, Rfl: 11    venlafaxine (EFFEXOR-XR) 150 MG Cp24, Take 150 mg by mouth once daily., Disp: , Rfl:      OBJECTIVE:     MENTAL STATUS AND OBSERVATIONS:   Appearance: Casually dressed and adequate grooming/hygiene.   Alertness: Inattentive; daughter often repeated questions while engaging in full eye contact to regain pts attention.   Orientation:   Not oriented. Responded incorrectly to all orientation questions however appeared to try to answer date of birth "1955" and age "66"   Gait:  Observed wide gait with slight imbalance.    Psychomotor:  Unable to assess   Vision & Hearing:  Adequate for session   Speech/language: Limited speech   Mood/Affect:  Could not illicit on intake. Affect was joyful and patient was playful   Interpersonal Behavior:  Rapport was quickly and easily established   Suicidality/Homicidality: Could not assess during intake due to limited speech   Hallucinations/Delusions:  None evidenced; none endorsed by family "   Thought Content: Could not assess during intake due to limited speech   Thought Processes: Could not assess during intake due to limited speech   Insight & Judgment:  Could not illicit on intake   Participation in Interview:  dependent on collateral     PROCEDURES/TESTS ADMINISTERED: Performed a review of pertinent medical records, reviewed limits to confidentiality, conducted a clinical interview, and explained procedures.       PPVT and NAB language module, BENIGNO  If pt is able to complete tasks add in MARYANN

## 2025-01-24 ENCOUNTER — TELEPHONE (OUTPATIENT)
Dept: NEUROLOGY | Facility: CLINIC | Age: 70
End: 2025-01-24
Payer: MEDICARE

## 2025-01-27 RX ORDER — BACLOFEN 5 MG/1
5 TABLET ORAL 3 TIMES DAILY
Qty: 270 TABLET | Refills: 0 | OUTPATIENT
Start: 2025-01-27 | End: 2025-04-27

## 2025-01-31 ENCOUNTER — TELEPHONE (OUTPATIENT)
Dept: NEUROLOGY | Facility: CLINIC | Age: 70
End: 2025-01-31
Payer: MEDICARE

## 2025-01-31 ENCOUNTER — PATIENT MESSAGE (OUTPATIENT)
Dept: NEUROLOGY | Facility: CLINIC | Age: 70
End: 2025-01-31
Payer: MEDICARE

## 2025-01-31 RX ORDER — BACLOFEN 5 MG/1
5 TABLET ORAL 3 TIMES DAILY
Qty: 270 TABLET | Refills: 0 | OUTPATIENT
Start: 2025-01-31 | End: 2025-05-01

## 2025-01-31 NOTE — TELEPHONE ENCOUNTER
Spoke with Karon, Ms. David's nurse, about scheduling pt for testing. Karon will be messaging back about testing date.

## 2025-02-03 RX ORDER — BACLOFEN 5 MG/1
5 TABLET ORAL 3 TIMES DAILY
Qty: 270 TABLET | Refills: 0 | Status: SHIPPED | OUTPATIENT
Start: 2025-02-03 | End: 2025-05-04

## 2025-03-03 ENCOUNTER — OFFICE VISIT (OUTPATIENT)
Facility: CLINIC | Age: 70
End: 2025-03-03
Payer: MEDICARE

## 2025-03-03 DIAGNOSIS — G20.B2 PARKINSON'S DISEASE WITH DYSKINESIA AND FLUCTUATING MANIFESTATIONS: ICD-10-CM

## 2025-03-03 DIAGNOSIS — F03.90 DEMENTIA, UNSPECIFIED DEMENTIA SEVERITY, UNSPECIFIED DEMENTIA TYPE, UNSPECIFIED WHETHER BEHAVIORAL, PSYCHOTIC, OR MOOD DISTURBANCE OR ANXIETY: Primary | ICD-10-CM

## 2025-03-03 RX ORDER — LEVODOPA AND CARBIDOPA 95; 23.75 MG/1; MG/1
CAPSULE, EXTENDED RELEASE ORAL
COMMUNITY
Start: 2024-07-02

## 2025-03-03 RX ORDER — RASAGILINE 1 MG/1
1 TABLET ORAL NIGHTLY
Qty: 30 TABLET | Refills: 12 | Status: SHIPPED | OUTPATIENT
Start: 2025-03-03 | End: 2026-03-03

## 2025-03-03 RX ORDER — VENLAFAXINE HYDROCHLORIDE 75 MG/1
TABLET, EXTENDED RELEASE ORAL
COMMUNITY
Start: 2025-02-11

## 2025-03-03 NOTE — PROGRESS NOTES
The patient location is: HOME  The chief complaint leading to visit is: iPD  1. Dementia, unspecified dementia severity, unspecified dementia type, unspecified whether behavioral, psychotic, or mood disturbance or anxiety        2. Parkinson's disease with dyskinesia and fluctuating manifestations            Visit type: Virtual visit with synchronous audio and video    Face to Face time with patient: 20mins  20 minutes of total time spent on the encounter, which includes face to face time and non-face to face time preparing to see the patient (eg, review of tests), Obtaining and/or reviewing separately obtained history, Documenting clinical information in the electronic or other health record, Independently interpreting results (not separately reported) and communicating results to the patient/family/caregiver, or Care coordination (not separately reported).     Each patient to whom he or she provides medical services by telemedicine is:  (1) informed of the relationship between the physician and patient and the respective role of any other health care provider with respect to management of the patient; and (2) notified that he or she may decline to receive medical services by telemedicine and may withdraw from such care at any time.      MOVEMENT DISORDERS [facility]INIC    PCP/Referring Provider: No referring provider defined for this encounter.  Date of Service: 3/3/2025    Chief Complaint: Pdism    Interval Hx    Since last visit,   Continues rytary 7 doses a day  Several different strength of rytary  Increased 11 AM Rytary 2 cap 95mg to 1 cap 245mg at 11   Also increased 1:30PM the same but this caused hyperactivity so back to 2 tabs 95mg     Contnues baclofen 5mg TID  Dyskinesias lowered  Continue mirtazapine    Takes   6AM  Rytary 145mg 1 cap   8:30AM  2 caps 95mg Rytary  11:00AM  1 245mg rytary   2 caps 95mg Rytary  1:30pm  2 caps 95mg Rytary  4:00pm  2 caps 95mg Rytary  6:30pm  2 caps 95mg Rytary  9pm   1  "95mg    Seems to have anxiety at 1.5 hrs after doses    "priorHPI: Michelle Dvaid is a R HANDED 69 y.o. female with a medical issues significant for Lspine dz s/p surgery, iPD coming for pt establishment. She noted 2016 L side tremors and stiffness. At that point mobile. 2022 still mobile. Started falling 2022 and needed a walker end of 2022 still able to walk a quarter mile. At this point she can walk 100 feet. Falls 3 times a year. Started having dyskinesias 3-4 years ago. 4 months ago notable increase to dyskinesias. 70% of the day with modest dyskiensias preventing her walking.  Has occasional L leg dystonia.    Saw a  In [place], then [facility], then [facility] [facility] [place]  Had genetic testing - PDgeneration - negative  Has a cousin with PDism  Brother with PDism    Failed PT - uncooperative    Memory is poor- comes and goes - simple conversations  Hallucinated children on the porch  Someparanoia    Has sitters year round    Medication history:  -Taking  [id number] QAM  Otherwise 2 caps 95mg q2.5H  Failed aricept - had diarrhea    Neuroleptic exposure:  None    Has incontinence    PD Review of Symptoms:  Anosmia: yes  Dysarthria/Hypophonia: yes  Dysphagia/Sialorrhea: mild  Depression: none  Cognitive slowing: poor  Hallucinations: yes  Urinary changes: -  Constipation: yes  Orthostasis: none  Dyskinesia: severe  Falls: yes  Freezing: none  Micrographia: yes  Sleep issues:  -RBD: none"    Review of Systems:   Review of Systems   Constitutional:  Negative for fever.   HENT:  Negative for congestion.    Eyes:  Negative for double vision.   Respiratory:  Negative for cough and shortness of breath.    Cardiovascular:  Negative for chest pain and leg swelling.   Gastrointestinal:  Negative for nausea.   Genitourinary:  Negative for dysuria.   Musculoskeletal:  Positive for falls.   Skin:  Negative for rash.   Neurological:  Positive for tremors and speech change. Negative for headaches. "   Psychiatric/Behavioral:  Positive for hallucinations and memory loss. Negative for depression.          Current Medications:  Outpatient Encounter Medications as of 3/3/2025   Medication Sig Dispense Refill    ACETAMINOPHEN ORAL Take 1 tablet by mouth once daily.      amLODIPine (NORVASC) 2.5 MG tablet Take 2.5 mg by mouth once daily.      baclofen (LIORESAL) 5 mg Tab tablet Take 1 tablet (5 mg total) by mouth 3 (three) times daily. 270 tablet 0    carbidopa-levodopa (RYTARY) 23.75-95 mg CpSR TAKE 2 CAPSULES EVERY 2.5 HOURS, UP TO SEVEN DOSES IN A DAY      carbidopa-levodopa (RYTARY) 36. mg CpSR Take 1 capsule by mouth once daily. 90 capsule 12    celecoxib (CELEBREX) 100 MG capsule Take 100 mg by mouth 2 (two) times a day.      cholecalciferol, vitamin D3, 125 mcg (5,000 unit) Tab Take 1 tablet by mouth every morning.      cranberry fruit concentrate (AZO CRANBERRY ORAL) Take by mouth 2 (two) times a day.      FA/mv,Ca,iron,min/lycopene/lut (MULTIVITAL ORAL) Take by mouth once daily.      gabapentin (NEURONTIN) 100 MG capsule Take 100 mg by mouth 3 (three) times daily.      HYDROcodone-acetaminophen (NORCO) 5-325 mg per tablet Take 1 tablet by mouth every 12 (twelve) hours as needed for Pain.      Lactobacillus acidophilus (PROBIOTIC) 10 billion cell Cap Take 50,000 Units by mouth once daily.      LORazepam (ATIVAN) 0.5 MG tablet Take 1 mg by mouth every evening.      lubiprostone (AMITIZA) 24 MCG Cap Take 24 mcg by mouth 2 (two) times daily with meals.      metoprolol succinate (TOPROL-XL) 50 MG 24 hr tablet Take 1 tablet by mouth once daily.      mirtazapine (REMERON) 15 MG tablet Take 15 mg by mouth every evening.      polyethylene glycol (GLYCOLAX) 17 gram/dose powder Take 17 g by mouth once daily.      rivastigmine tartrate (EXELON) 1.5 MG capsule Take 1 capsule (1.5 mg total) by mouth 2 (two) times daily. 60 capsule 11    venlafaxine 75 mg TR24       carbidopa-levodopa (RYTARY) 23.75-95 mg CpSR Take 2  capsules by mouth 6 (six) times daily. 1080 capsule 12    cholecalciferol, vitamin D3, 125 mcg (5,000 unit) Tab Take 5,000 Units by mouth once daily.      rasagiline (AZILECT) 1 mg Tab Take 1 tablet (1 mg total) by mouth every evening. 30 tablet 12    venlafaxine (EFFEXOR-XR) 150 MG Cp24 Take 150 mg by mouth once daily.      [DISCONTINUED] amantadine  mg Tab Take 1 tablet (100 mg total) by mouth once daily. 30 tablet 11    [DISCONTINUED] baclofen (LIORESAL) 5 mg Tab tablet TAKE 1 TABLET (5 MG TOTAL) BY MOUTH 3 (THREE) TIMES DAILY. 270 tablet 0     No facility-administered encounter medications on file as of 3/3/2025.       Past Medical History:  Patient Active Problem List   Diagnosis    Parkinson's disease with dyskinesia and fluctuating manifestations    Memory change    Dementia, unspecified dementia severity, unspecified dementia type, unspecified whether behavioral, psychotic, or mood disturbance or anxiety       Past Surgical History:  No past surgical history on file.    Social:  Social History     Socioeconomic History    Marital status:      Social Drivers of Health     Financial Resource Strain: Low Risk  (2/25/2025)    Overall Financial Resource Strain (CARDIA)     Difficulty of Paying Living Expenses: Not hard at all   Food Insecurity: No Food Insecurity (2/25/2025)    Hunger Vital Sign     Worried About Running Out of Food in the Last Year: Never true     Ran Out of Food in the Last Year: Never true   Transportation Needs: No Transportation Needs (2/25/2025)    PRAPARE - Transportation     Lack of Transportation (Medical): No     Lack of Transportation (Non-Medical): No   Physical Activity: Inactive (2/25/2025)    Exercise Vital Sign     Days of Exercise per Week: 0 days     Minutes of Exercise per Session: 0 min   Stress: Stress Concern Present (2/25/2025)    [place] [employer] - Occupational Stress Questionnaire     Feeling of Stress : To some extent   Housing  Stability: Low Risk  (2/25/2025)    Housing Stability Vital Sign     Unable to Pay for Housing in the Last Year: No     Number of Times Moved in the Last Year: 0     Homeless in the Last Year: No       Family History:  No family history on file.    PHYSICAL:  There were no vitals taken for this visit.    Physical Exam  Constitutional: Well-developed, well-nourished, appears stated age  Eyes: No scleral icterus  ENT: Moist oral mucosa  Cardiovascular: No lower extremity edema   Respiratory: No labored breathing   Skin: No rash   Hematologic: No bruising    Other: GI/ deferred   Mental status: Alert and oriented to person, place, time, and situation;   Mild confusion    follows commands  Speech: normal (not dysarthric), no aphasia  Cranial nerves:            CN II: Pupils mid-position and equal, not tested light or accommodation  CN III, IV, VI: Extraocular movements full, no nystagmus visualized  CN V: Not tested   CN VII: Face strong and symmetric bilaterally   CN VIII: Hearing intact to voice and conversation   CN IX, X: Palate raises midline and symmetric   CN XI: Strong shoulder shrug B/L  CN XII: Tongue appears midline   Motor: Normal bulk by appearance, no drift   Sensory: Not tested    Gait: mod shuffle b/l armswing decrease  Deep tendon reflexes: Not tested  Movement/Coordination                    severe hypophonic speech.                     mod facial masking.  Mod to severe bradykinesia.                   General Medical Examination:  General: Good hygiene, appropriate appearance.  HEENT: Normocephalic, atraumatic.   Neck: Supple.   Chest: Unlabored breathing.   CV: Symmetric pulses.   Ext: No clubbing, cyanosis, or edema.     Mental Status:  Mood/Affect: Appropriate/congruent.  Level of consciousness: Awake, alert.  Orientation: Not oriented to hospital floor or president  Some mildly coherent  Language: No Dysarthria    Cranial nerves:  I: Not tested  II: PERRL, VFF to counting  III, IV, VI: EOMI with  "conjugate gaze and no nystagmus on end gaze  V: Facial sensation intact and symmetric over the bilateral V1-V3  VII: Facial muscle activation intact and symmetric over the bilateral upper and lower face  VIII: Hearing intact in the b/l ears and symmetrical to finger rub  IX, X, XII: TUP midline - no atrophy or fasiculations  X: SCMs and shoulder shrug full strength b/l and symmetric    Motor:   -UE: 5/5 deltoids; 5/5 biceps, triceps; 5/5 wrist flexors, extensors; 5/5 interosseous; 5/5   -LEs: 5/5 hip flexion, extension; 5/5 knee flexion, extension; 5/5 ankle flexion, extension    Mod hypomimia  Severe ballistic chorea    DTRs:  ? Biceps Triceps Brachioradialis Knee Ankle   Left 2+ 2+ 2+ 2+    Right 2+ 2+ 2+ 2+      Cannot perform finger taps consistently    Neck tone: nl  ? Arm Leg   Left 1+ 1+   Right 1+ 1+     Sensation:   -Light touch: Intact and symmetric in the bilateral upper and lower extremities.    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  Ontime is very short causing issues  On a complex dose of 3 different strength of rytary    6AM  Rytary 145mg 1 cap   8:30AM  2 caps 95mg Rytary  11:00AM  1 245mg rytary   2 caps 95mg Rytary  1:30pm  Suggsted start replacing 1:30PM doses of 2 tabs 95mg rytary to 2 145mg caps rytary  4:00pm  2 caps 95mg Rytary  6:30pm  2 caps 95mg Rytary  9pm   1 95mg    Also suggested add rasagiline 1mg QHS  She understands that effexor and mirtazapine and rasagiline can produce serotonin syndrome in extremely rare cases and knows to stop these medications and come to seek urgent medical care in case of this. He agrees " benefits outweigh risks.    Continue mirtazapine and baclofen         Dementia, unspecified dementia severity, unspecified dementia type, unspecified whether behavioral, psychotic, or mood disturbance or anxiety - Primary    Current Assessment & Plan   Appears to have PDD  Awaiting neuropscyh testing  Continue excelon 1.5mg BID as above              This visit covered ongoing complex medical needs extending over multiple office visits covering multiple chronic issues.      Vi Estrada MD, MS  [facility]  Department of Neurology  Movement Disorders

## 2025-03-03 NOTE — ASSESSMENT & PLAN NOTE
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  Ontime is very short causing issues  On a complex dose of 3 different strength of rytary    6AM  Rytary 145mg 1 cap   8:30AM  2 caps 95mg Rytary  11:00AM  1 245mg rytary   2 caps 95mg Rytary  1:30pm  Suggsted start replacing 1:30PM doses of 2 tabs 95mg rytary to 2 145mg caps rytary  4:00pm  2 caps 95mg Rytary  6:30pm  2 caps 95mg Rytary  9pm   1 95mg    Also suggested add rasagiline 1mg QHS  She understands that effexor and mirtazapine and rasagiline can produce serotonin syndrome in extremely rare cases and knows to stop these medications and come to seek urgent medical care in case of this. He agrees benefits outweigh risks.    Continue mirtazapine and baclofen   No

## 2025-03-05 ENCOUNTER — OFFICE VISIT (OUTPATIENT)
Dept: NEUROLOGY | Facility: CLINIC | Age: 70
End: 2025-03-05
Payer: MEDICARE

## 2025-03-05 ENCOUNTER — TELEPHONE (OUTPATIENT)
Dept: NEUROLOGY | Facility: CLINIC | Age: 70
End: 2025-03-05
Payer: MEDICARE

## 2025-03-05 DIAGNOSIS — G20.A1: Primary | ICD-10-CM

## 2025-03-05 DIAGNOSIS — R41.3 MEMORY CHANGE: ICD-10-CM

## 2025-03-05 DIAGNOSIS — F02.C2: Primary | ICD-10-CM

## 2025-03-05 PROCEDURE — 96138 PSYCL/NRPSYC TECH 1ST: CPT | Mod: ,,, | Performed by: PSYCHIATRY & NEUROLOGY

## 2025-03-05 PROCEDURE — 99211 OFF/OP EST MAY X REQ PHY/QHP: CPT | Mod: PBBFAC | Performed by: PSYCHIATRY & NEUROLOGY

## 2025-03-05 PROCEDURE — 96139 PSYCL/NRPSYC TST TECH EA: CPT | Mod: ,,, | Performed by: PSYCHIATRY & NEUROLOGY

## 2025-03-05 PROCEDURE — 96133 NRPSYC TST EVAL PHYS/QHP EA: CPT | Mod: ,,, | Performed by: PSYCHIATRY & NEUROLOGY

## 2025-03-05 PROCEDURE — 99999 PR PBB SHADOW E&M-EST. PATIENT-LVL I: CPT | Mod: PBBFAC,,, | Performed by: PSYCHIATRY & NEUROLOGY

## 2025-03-05 PROCEDURE — 99499 UNLISTED E&M SERVICE: CPT | Mod: S$PBB,,, | Performed by: PSYCHIATRY & NEUROLOGY

## 2025-03-05 PROCEDURE — 96132 NRPSYC TST EVAL PHYS/QHP 1ST: CPT | Mod: ,,, | Performed by: PSYCHIATRY & NEUROLOGY

## 2025-03-10 ENCOUNTER — TELEPHONE (OUTPATIENT)
Facility: CLINIC | Age: 70
End: 2025-03-10
Payer: MEDICARE

## 2025-03-10 NOTE — TELEPHONE ENCOUNTER
Called PT today about scheduling a 2 mos VV. Pts daughter  and PT has been scheduled on 5/12/25 at 9:40am.

## 2025-03-17 DIAGNOSIS — G20.B2 PARKINSON'S DISEASE WITH DYSKINESIA AND FLUCTUATING MANIFESTATIONS: Primary | ICD-10-CM

## 2025-03-17 PROBLEM — G20.A1: Status: ACTIVE | Noted: 2024-12-06

## 2025-03-17 PROBLEM — F02.C2: Status: ACTIVE | Noted: 2024-12-06

## 2025-03-17 RX ORDER — LEVODOPA AND CARBIDOPA 145; 36.25 MG/1; MG/1
CAPSULE, EXTENDED RELEASE ORAL
Qty: 90 CAPSULE | Refills: 11 | Status: SHIPPED | OUTPATIENT
Start: 2025-03-17

## 2025-03-17 RX ORDER — LEVODOPA AND CARBIDOPA 95; 23.75 MG/1; MG/1
2 CAPSULE, EXTENDED RELEASE ORAL 3 TIMES DAILY
Qty: 210 CAPSULE | Refills: 11 | Status: SHIPPED | OUTPATIENT
Start: 2025-03-17

## 2025-03-17 NOTE — PROGRESS NOTES
NEUROPSYCHOLOGY CONSULT (TELEHEALTH)  Referral Information  Name: Michelle David  MRN: [id number]  : 1955  GAVIN: 25  Age: 69 y.o.  Race: White  Gender: female  Referring Provider: Vi Estrada MD  Referral Diagnoses: Memory change [R41.3]    Order comments: Memroy baseline Screen for PD plus given memory decline   Consent/Emergency Plan: The patient's caregivers expressed an understanding of the purpose of the evaluation and consented to all procedures, including providing consent for Fallon Castano Psy.D., a clinical neuropsychology fellow under the supervision of  Karen Lemus Psy.D., to be involved in her care. Due to limited verbal ability and comprehension observed during intake, Ms. Reynaga provided assent. We discussed the limits of confidentiality and discussed an emergency plan. She additionally provided assent to speak with her daughter and , who were present during the clinical interview.    SUMMARY   Ms. Michelle David is a 69 y.o., female with medical history including dementia 2/2 idiopathic Parkinson's Disease (onset ), atrial fibrillation, anxiety, depression, and family history of parkinsonism who was referred for a neuropsychological evaluation. History was primarily provided by daughter and pt's . They report onset of physical symptoms starting in . Prior neuropsych evaluation at the [facility] in  found dementia that rendered her inappropriate for DBS. Today Ms. David is largely nonverbal, but her  reports sudden instances of improved cognition lasting minutes. She is dependent for all iADLs and ADLs. Family reports restlessness, hallucinations, paranoia, and compulsive behaviors. Sleep is good though she snores and has untreated sleep apnea. She uses a walker and has increased falls. Family has sitters and locks/alarms on doors. Most-recent brain MRI showed mild chronic ischemic changes, general volume loss, and hippocampal  volume loss. Reversible labs are WNL. Anticholinergic burden is elevated (4; celecoxib, Ativan, Mirtazapine, Venlafaxine).     As expected, Ms. David's testing suggests extensive impairments. She struggled to understand both the test instructions and the testing situation more broadly. Her verbal communication was very limited, frequently unintelligible, with phonemic paraphasic errors and neologisms. She often provided no responses at all. She was perseverative with some stimulus-bound behavior. On receptive language testing, even when a verbal response was not required, her scores were well below normal.     Her overall presentation is most consistent with advanced dementia due to Parkinson's disease. Due to global impairment, test results are not helpful for differentiating between dementia due to iPD vs PD+. Although it sounds like relatively extensive deficits were also noted during 2023 evaluation, she appears to be continuing to decline fairly rapidly, as she was at least able to complete testing in 2023 and during our evaluation testing was essentially impossible due to the extent of her deficits. It's unclear exactly when her cognitive symptoms began relative to physical symptoms, but if she was at a dementia level by 2023 I think it's reasonable to imagine symptoms began at least as early as 2020, possibly earlier.     The family would like to be connected with caregiver support services to help them cope with the progression of Ms. De Lunas symptoms and prepare for end-of-life care. Will refer to our dementia care management program. We will also discuss referral to palliative care during feedback.      ASSESSMENT       1. 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 Memory change  -     Ambulatory consult to Neuropsychology          PLAN:   Ms. David is scheduled for feedback  Will refer to our dementia care management program   Will offer referral to Dr Hernandez in  palliative medicine          Thank you for allowing us to participate in Ms. David's care.  If you have any questions, please contact me at [phone].       Karen Lemus Psy.D.   Licensed Clinical Neuropsychologist  [facility] - Department of Neurology    CLINICAL INTERVIEW & RECORD REVIEW:     Michelle David is a 69 y.o. female with a medical history significant for dementia 2/2 iPD, Lspine dz s/p surgery, atrial fibrillation, TIA (2019), anxiety, depression, and family history of parkinsonism referred for neuropsychological evaluation.  She began experiencing tremors and stiffness on her left side in 2016. Over time, her physical symptoms have progressively worsened, and since 2022, she has been falling a few times each year. She has been seen by multiple neurologists in [place], the [facility], and at [facility]. In 2015, she was referred by neurology for a neuropsychological evaluation due to memory concerns. She underwent a brief evaluation and was diagnosed with ADHD and anxiety Family noticed onset of cognitive declines 2-3 years ago though they suspect symptoms had and earlier onset. In 2023, she underwent an evaluation for bilateral STN DBS at the [facility]. However, she was not approved for surgery due to cognitive declines identified during a neuropsychological evaluation. In May 2024 she had two episodes of paranoia leading to initiation of Seroquel. Shortly after she had a syncopal episode and a visit to the ER for sinus tachycardia. She was given fluids and symptoms improved; providers suspected serotonin reaction. In October 2024 she had another episode of paranoia, left home unsupervised, and was found by a neighbor. When she returned her blood pressure was found to be 70/40; she was given fluids at home and BP normalized the next day. She is being followed by neurology for PD with most recent evaluation reporting concerns for falls, ongoing neuropsychiatric  "symptoms, and progressive declines in cognition.    PD Review of Symptoms assessed by Neurology on 8/8/2024:  Anosmia: yes  Dysarthria/Hypophonia: yes  Dysphagia/Sialorrhea: mild  Depression: none  Cognitive slowing: poor  Hallucinations: yes  Urinary changes: no, ongoing incontinence, using depends  Constipation: yes, also reporting fecal incontinence  Orthostasis: none  Dyskinesia: severe  Falls: yes  Freezing: none  Micrographia: yes  Sleep issues: sleeping well; RBD: none    Cognitive Functioning   Previous evaluation(s) & general findings:   [facility] 2-3 years ago: Records show a note in June 2023 reporting "It was requested that the report be forwarded to Dr. Cobos for his review. Her verbal report indicates pt to have decline in executive functioning, decreased ability to store and return new info, poor short-term to long-term memory conversion. Visual memory is better that auditory, although both are poor. She categorizes her as having "profound memory loss" and recommends meds for treatment of dementia." Pt's dtr is planning to send us the report.    Pt saw was seen by Dr. Luis Casarez in 2015 at [facility] 2015 for a brief evaluation to assess attention versus anxiety. Pt was diagnosed with ADHD and anxiety. She did not undergo formal cognitive testing at the time.     Onset & course of difficulty: [place] [facility] 2-3 years ago and noticed difficult to keep steady conversation (attention and comprehension, started sentence then forgetting what was about)    Examples: Changes observed since [facility] testing completed 2-3 years ago  Attention/Working Memory: Declines in attention  Executive Functioning/Planning: less able to follow conversations and follow short commands. Gets confused with what to do with medication when in her mouth  Processing Speed: Slowed processing speed though she is also significantly impaired in comprehension  Language: Comprehension difficulty, " "perseverates  Visuospatial: Able to navigate through her home but also receives constant supervision from caregivers. Pt has history of wandering, so family has door alarms/locks.  Learning & Memory: Declines in both learning and memory.     Exacerbating factors: none    Ameliorating factors: none    Daily Functioning    ADLs  Self-Care Eating Safety   Dependent  Caregivers try to promote as much independence as possible Independent   Pt has 24 hour care with sitters, nurse, , and daughter.  Using a walker     Instrumental IADLs:   Driving Medication Mgmt/Health Household Mgmt Finances   Dependent  Dependent  Family took over medication management 2 years ago and appts 3 years ago Dependent   Dependent     Physical Symptoms:    Tremor: Dtr reports tremor during medication OFF state; described as minor bouncing and curling off foot.   Neurology [place] 2017: "subtle symptoms that have started perhaps 2-3 months ago. She noticed an odd posture in her left arm, decreased arm swing when she walks. She has rarely had a mild resting tremor, which is not visible at all today. Her speech is hypophonic, and she has micrographia, there is probably very little question that this is early Parkinson's. She is left-hand dominant unfortunately."  Difficulty walking: yes, walking around her home with the aid of a rollator and occasionally without it. Neurology reported notable increase to dyskinesias starting 8 months ago. 70% of the day with modest dyskiensias preventing her from walking. She has had 3 or 4 falls recently. Has occasional L leg dystonia.   Imbalance: yes   Falls: yes, dtr reported she started falling 2-3 years ago. Last fall with head injury without LOC in July 2024.   Trouble with fine motor movements: yes, buttoning clothing is difficult  Difficulty swallowing: No  Lightheadedness/Dizziness/Syncope: yes, when stands up  Urinary or Bowel Urgency/Incontinence: yes, has urinary and bowel incontinence; " "wearing depends  Sensory Sxs: yes, lost smell in 2017  Pain: Dtr reports she does not seem to have much pain, she does sleep with pillow under knees  Physical Exercise Routine: walks around the house    Psychiatric/Neuropsychiatric Symptoms   Mood: Dtr reports mood is labile. She finds pt is sometimes restless. Dtr believes this is result of medication and overstimulation.   Depression: Dtr reports "not much recently"  Current Ideation, Intention, or Plan: Could not assess during intake due to limited speech/comprehension. Family and sitters are always present and have not observed any concerning behavior.  Eveline/Hypomania: no  Anxiety: yes per dtr, she is more anxious when grandchildren are present.  Stress: no   Social Withdrawal: no   Neurovegetative Sxs:  Appetite: good, sometimes doesn't want to eat, weight has improved  Sleep: 9PM-6:30AM, wakes up to go to use restroom, starting 1 week ago sitters present at night, no naps  Insomnia: no    Snoring: yes   Apnea: yes, untreated   Acting out dreams: no   Energy: comes and goes  Hallucinations: yes, 1/month, maybe seeing people  Delusional/Paranoid Thinking: yes, fear of people out to get her started 2 years ago. At the same time nursing staff started  Obsessive/Impulsive/Compulsive Behaviors: yes, throwing everything away, hiding things, was hiding mail so dtr rerouted her mail to [facility] house. Until family changed [employer] passwords 2-3 years ago, pt was ordering a lot   Disinhibition: yes, a couple years ago, less so now  Irritability/Agitation: no   Aggression: no   Apathy/Indifference: no   Pseudobulbar Affect: no     RELEVANT HISTORY  Psychiatric History   Prior Diagnoses: Anxiety  History of Trauma/Abuse: no   History of Suicide Attempts: no   Hospitalization(s): no   Psychotherapy/Counseling: Pt saw psychiatrist last week at outside facility for restlessness. Records not available.    Substance Use History   History of abuse/overuse: no   Caffeine black tea 1 " "cup a day    Neurological History    Headaches/Migraines: no   TBI: yes, dtr reports she had a head injury with possible LOC as a teenager, dtr denies onset or persistence of cognitive symptoms following the injury. Family deny LOC in adulthood.   Seizures: no   Stroke: TIA on 11/11/2019- Admitted for "chest pain, left-sided numbness and weakness with reported heart rates in the 140s." "cardiologist was consulted and later that evening she was changed from aspirin to Eliquis 5 mg p.o. twice daily. CT scan of the head was negative. Carotid ultrasound showed 0 to 19% stenosis bilaterally, MRI of the brain did not show an acute stroke, MRA of the brain did not show any aneurysms and was normal. Echo showed a preserved EF of 55 to 60%. Patient's metoprolol was increased from 25 mg to 50 mg"  Tumor: no   Previous Episodes of Delirium: Over a year ago she had a UTI with AMS, dtr reports AMS resolved with treatment of UTI and she returned to her baseline.  Movement Disorder: Parkinson's Disease  PD Review of Symptoms:  ON/OFF state: Pt takes carbidopa-levodopa every 2.5 hours, dtr reports she does not have much off time  Tremor: mild  Gait change: a little unsteady, wide gait, foot drop  Rigidity: when off meds, back and foot  Bradykinesia: no  Masked facies: yes, and presents with surprised affect throughout todays intake  Anosmia: loss of smell in 2017  Dysarthria/Hypophonia: yes  Dysphagia/Drooling: mild   Depression: improved  Cognitive slowing: yes  Fluctuating cognition: yes,  reports she is "sometimes her old self" for 15 minutes or so; they are unsure of a pattern or if in relation to medication ON/OFF state  Hallucinations: yes, has seen people  Impulsivity: yes  Obsessions/Compulsions: yes, hoarding and hiding items  Urinary changes: ongoing urinary incontinence, wearing depends  Constipation: yes per 8/8/24 neurology appt  Orthostasis (OH, drop in BP when stand up): maybe per dtr  Falls: yes, 3-4 a " year  Freezing: no per 24 neurology appt  Micrographia: can't recognize writing maybe gotten bigger  Sleep issues:       -MARBIN: yes, untreated      -RBD: no  Vision:  no reported changes   Multiple Sclerosis: no   CNS Infection: no     Family Neurological & Psychiatric History     Neurologic: Has a cousin with Pdism, Brother with Pdism   Psychiatric: Negative for heritable risk factors.    Development  Education   Born & raised: [place]  Prenatal and  development: WNL  Developmental milestones: WNL  Language Acquisition: English first language    Level Attained: 18, bachelor's degree in art   Learning/Attention/Behavior Difficulties: Dtr reports possible undiagnosed dyslexia and ADHD. Pt never enjoyed reading possibly due to difficulty, would write incorrectly with letters flipped  Repeated Grade(s): no   Typical Grades: Dtr thinks she generally did well in school        Occupation  Social   Occupational Status: stopped working in her early 30s  Primary Occupation: Taught art, owned convenience stores    Family Status: , 2nd marriage    Current Living Situation: lives in home with , 24/hr sitters, and a nurse  Support System: Family, sitters, nurse  Hobbies/Activities: sit outside, be around family       Medical Status   Patient Active Problem List   Diagnosis    Parkinson's disease with dyskinesia and fluctuating manifestations    Memory change    Dementia, unspecified dementia severity, unspecified dementia type, unspecified whether behavioral, psychotic, or mood disturbance or anxiety     Neurodiagnostics     Results for orders placed or performed during the hospital encounter of 24   MRI Brain W WO Contrast    Narrative    EXAMINATION:  MRI BRAIN W WO CONTRAST    CLINICAL HISTORY:  Memory loss; Other amnesia    TECHNIQUE:  Multiplanar multisequence MR imaging of the brain was performed before and after the administration of 7 mL Gadavist intravenous contrast.    COMPARISON:  Report  of prior study dated 06/14/2023    FINDINGS:  There is some widening of the ventricles out of proportion to the sulci suggesting an element of central volume loss.  No evidence of hydrocephalus or mass effect.  An element of hippocampal volume loss is suspected although this does not appear markedly advanced.    No intracranial hemorrhage or acute infarct.    Small foci of increased signal within the subcortical and periventricular white matter and vale are nonspecific but may reflect mild chronic small vessel ischemic change.  No evidence of prior cortical/territorial infarct.  No evidence of amyloid angiopathy.    No enhancing intracranial lesion.    Normal arterial flow voids are preserved at the skull base.    The visualized sinuses and mastoid air cells are clear.      Impression    No acute intracranial process or enhancing intracranial lesion.    Element of diffuse volume loss as discussed above.  No evidence of advanced chronic ischemic changes.     MRI BRAIN W WO CONTRAST completed at [facility] on 6/14/2023  Impression  1. No evidence of acute cerebral pathology.  2. 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  3. Subtle increased T2 signal within the globi pallidi bilaterally is nonspecific but may represent  the remote sequela of a toxic or metabolic injury.  4. Spondylosis with spinal canal narrowing at the C3-C4 and C4-C5 level could be more accurately  evaluated with dedicated spinal MRI.    Narrative  EXAM: MR BRAIN WITHOUT AND WITH IV CONTRAST    COMPARISON: Head CT 03/18/2022.    FINDINGS:    The study was performed using pre-DBS/FUS protocol for presurgical planning purposes.    The examination is partially degraded by patient motion artifact.    Scattered nonspecific foci of T2 signal hyperintensity within the cerebral white matter are most  consistent with mild-to-moderate microvascular ischemic disease or leukoaraiosis. Moderate  generalized age-related cerebral volume loss.    Subtle increase in  "T2 signal hyperintensity within the globi pallidi bilaterally (4:42) may  represent the sequela of chronic toxic or metabolic injury.    The brain is otherwise normal without evidence of mass lesions, abnormal contrast enhancement, or  diffusion restriction. Diffusion tensor imaging obtained for preoperative planning purposes  demonstrate grossly normal anisotropy within the major white matter tracts. Normal flow voids are  present within the intracranial vessels.    The paranasal sinuses and mastoid air cells are clear.    Within the partially visualized cervical spine, spondylitic changes cause spinal canal narrowing at  the C2-C3 and C3-C4 levels. This could be more accurately evaluated with dedicated cervical spine  MRI if clinically warranted.    Pertinent Lab Work     Lab Results   Component Value Date    [id number] 486 08/08/2024     Lab Results   Component Value Date    RPR Non-reactive 08/08/2024     No results found for: "FOLATE"  Lab Results   Component Value Date    TSH 0.508 08/08/2024     Lab Results   Component Value Date    HGBA1C 5.1 12/29/2023     No results found for: "HIV1X2", "BYE24HYID"    Medications     Current Outpatient Medications:     ACETAMINOPHEN ORAL, Take 1 tablet by mouth once daily., Disp: , Rfl:     amantadine  mg Tab, Take 1 tablet (100 mg total) by mouth once daily., Disp: 30 tablet, Rfl: 11    amLODIPine (NORVASC) 2.5 MG tablet, Take 2.5 mg by mouth once daily., Disp: , Rfl:     baclofen (LIORESAL) 5 mg Tab tablet, TAKE 1 TABLET (5 MG TOTAL) BY MOUTH 3 (THREE) TIMES DAILY., Disp: 270 tablet, Rfl: 0    carbidopa-levodopa (RYTARY) 23.75-95 mg CpSR, Take 2 capsules by mouth 6 (six) times daily., Disp: 1080 capsule, Rfl: 12    carbidopa-levodopa (RYTARY) 36. mg CpSR, Take 1 capsule by mouth once daily., Disp: 90 capsule, Rfl: 12    celecoxib (CELEBREX) 100 MG capsule, Take 100 mg by mouth 2 (two) times a day., Disp: , Rfl:     cholecalciferol, vitamin D3, 125 mcg (5,000 " unit) Tab, Take 1 tablet by mouth every morning., Disp: , Rfl:     cholecalciferol, vitamin D3, 125 mcg (5,000 unit) Tab, Take 5,000 Units by mouth once daily., Disp: , Rfl:     cranberry fruit concentrate (AZO CRANBERRY ORAL), Take by mouth 2 (two) times a day., Disp: , Rfl:     FA/mv,Ca,iron,min/lycopene/lut (MULTIVITAL ORAL), Take by mouth once daily., Disp: , Rfl:     gabapentin (NEURONTIN) 100 MG capsule, Take 100 mg by mouth 3 (three) times daily., Disp: , Rfl:     HYDROcodone-acetaminophen (NORCO) 5-325 mg per tablet, Take 1 tablet by mouth every 12 (twelve) hours as needed for Pain., Disp: , Rfl:     Lactobacillus acidophilus (PROBIOTIC) 10 billion cell Cap, Take 50,000 Units by mouth once daily., Disp: , Rfl:     LORazepam (ATIVAN) 0.5 MG tablet, Take 1 mg by mouth every evening., Disp: , Rfl:     lubiprostone (AMITIZA) 24 MCG Cap, Take 24 mcg by mouth 2 (two) times daily with meals., Disp: , Rfl:     metoprolol succinate (TOPROL-XL) 50 MG 24 hr tablet, Take 1 tablet by mouth once daily., Disp: , Rfl:     mirtazapine (REMERON) 15 MG tablet, Take 15 mg by mouth every evening., Disp: , Rfl:     polyethylene glycol (GLYCOLAX) 17 gram/dose powder, Take 17 g by mouth once daily., Disp: , Rfl:     rivastigmine tartrate (EXELON) 1.5 MG capsule, Take 1 capsule (1.5 mg total) by mouth 2 (two) times daily., Disp: 60 capsule, Rfl: 11    venlafaxine (EFFEXOR-XR) 150 MG Cp24, Take 150 mg by mouth once daily., Disp: , Rfl:        OBJECTIVE:       MENTAL STATUS AND BEHAVIORAL OBSERVATIONS:  Appearance: Casually dressed and Well groomed  Behavior:   alert and calm  Orientation:   Disoriented   Sensory:   Hearing and vision appeared adequate for testing purposes.  Gait:   not observed; pt in wheelchair  Psychomotor:  Within Normal Limits  Speech:  Limited output, poor articulation   Language:  Phonemic paraphasic errors present, neologisms, Difficulty with simple and complex  "commands.  Mood:   euthymic  Affect:  flat  Thought Process: unable to elicit due to paucity of consent  Thought Content: No evidence of psychotic symptoms.  Judgment/Insight: Impaired  Validity:  Valid  Test Taking Bx: Behaviorally, the pt appeared to put forth good effort, and cognitive test scores are generally commensurate with their overall functional level.   As a result, scores are considered a valid reflection of the pt's functioning.  Test Taking Bx:  Per psychometrist observations, The pt arrived to the appt with caregivers. The pt was in a wheelchair, but was at one point able to walk briefly with and without aid. Throughout testing the pt was fidgeting with her pants and the table. The pt was moving around in the wheelchair. At one point, the pt stood up from the wheelchair and tried to leave the room. The pt tried to communicate, but formed unintelligible sentences. The pt looked around the room a lot. Multiple times throughout NAB Naming the pt stated an answer that was similar to the correct answer, but ultimately a different or made-up word. For example, for 'camel' the pt said "gonzales" and then "kennel." For 'crutch' the pt said "chair" and "crush." For 'calculator the pt said "calracan." For 'ostrich' the pt said "icing." Some subtests of the NAB were discontinued when the pt started to respond with taps on the desk. The PPVT was discontinued when the pt did not provide an answer for any training modules.       PROCEDURES/TESTS ADMINISTERED:  In addition to performing a review of pertinent medical records, reviewing limits to confidentiality, conducting a clinical interview, and explaining procedures, the following measures were administered:   Neuropsychological Assessment Battery (NAB - Auditory Comprehension, Naming) and Frontal Assessment Battery (BENIGNO - Sylvia et al, 2005 norms). Manual norms were used unless otherwise indicated.     NEUROPSYCHOLOGICAL ASSESSMENT RESULTS:  The following " should not be interpreted in isolation from the neuropsychological evaluation report.  Scores on stand-alone and/or embedded performance validity measures were WNL.    LANGUAGE FUNCTIONING Raw Score Score Type Standardized Score Percentile/CP Descriptor   NAB Oral Production 3 Tscore 20 0.1 Exceptionally Low    NAB Auditory Comp Colors 13 - - 100 WNL   NAB Auditory Comp Shapes 10 - - 0 Exceptionally Low    NAB Auditory Comp Colors/Shapes/Numbers 10 - - 0 Exceptionally Low    NAB Auditory Comp Pointing 0 - - 0 Exceptionally Low    NAB Naming 11 Tscore 19 <0.1 Exceptionally Low    EXECUTIVE FUNCTIONING Raw Score Score Type Standardized Score Percentile/CP Descriptor   BENIGNO 5 zscore -15.13 <0.1 Exceptionally Low    ss = scaled score (mean = 10, SD = 3); SS = standard score (mean = 100, SD = 15); Tscore mean = 50, SD = 10; zscore (mean = 0.00, SD = 1)         Billing/Services Summary          Neurobehavioral Status Exam Base Code (05079)  Total Units: 0    Face-to-face Total Time: 0 min.         Professional Neuropsychological Testing Evaluation Services Base Code (71814)   Total Units: 1 Add-on (22573)  Total Units: 2   Referral review/initial test selection 15 min.    Intra-session Clinical Decision-Making          Tech consult/test review/modification 0 min.         Patient behavior management 0 min.    Face-to-face Interpretive Feedback 60 min.    Record Review/Integration/Report Generation 120 min.     Total Time: 195 min.         Test Administration by Psychologist Base Code (46526)   Total Units: 0 Add-on (67350)  Total Units: 0   Testing 0 min.    Scoring 0 min.     Total Time: 0 min.         Test Administration by Technician  Technician Name: EULALIO Godfrey Base Code (67513)   Total Units: 1 Add-on (71741)\  Total Units: 2   Face-to-Face Testin min.    Scoring 30 min.     Total Time: 91 min.    DOS is the date of the evaluation unless specified

## 2025-03-17 NOTE — TELEPHONE ENCOUNTER
PA needed for Rytary. Updated prescriptions to reflect current dosing schedule.    Called daughter to clarify

## 2025-03-21 ENCOUNTER — TELEPHONE (OUTPATIENT)
Facility: CLINIC | Age: 70
End: 2025-03-21
Payer: MEDICARE

## 2025-03-21 ENCOUNTER — PATIENT MESSAGE (OUTPATIENT)
Dept: NEUROLOGY | Facility: CLINIC | Age: 70
End: 2025-03-21
Payer: MEDICARE

## 2025-03-21 NOTE — TELEPHONE ENCOUNTER
Received request for PA for Rytary. Pt is currently taking three separate doses.  Forwarded via Email to Ana Suarez, Medication Access Specialist.

## 2025-03-24 ENCOUNTER — TELEPHONE (OUTPATIENT)
Facility: CLINIC | Age: 70
End: 2025-03-24
Payer: MEDICARE

## 2025-03-24 NOTE — TELEPHONE ENCOUNTER
----- Message from Gilbert Ha sent at 3/24/2025  2:50 PM CDT -----  Regarding: FW: Verify Directions (3rd attempt)    ----- Message -----  From: Marce Lane  Sent: 3/24/2025   2:12 PM CDT  To: Natalie Lebron Staff  Subject: Verify Directions (3rd attempt)                  Pharmacy is calling to speak with someone in the office to discuss rx: carbidopa-levodopa (RYTARY) 23.75-95 mg CpSR. Pt states that they have questions regarding the directions for medication  Additional Information: Phone: [phone] [facility] - [place], [place] - [address] [facility] [address] [address]: [phone] Fax: [phone]  
Called [facility] to clarify Rytary prescription.   
normal (ped)...

## 2025-03-25 DIAGNOSIS — G20.B2 PARKINSON'S DISEASE WITH DYSKINESIA AND FLUCTUATING MANIFESTATIONS: ICD-10-CM

## 2025-03-25 RX ORDER — LEVODOPA AND CARBIDOPA 95; 23.75 MG/1; MG/1
2 CAPSULE, EXTENDED RELEASE ORAL
Qty: 210 CAPSULE | Refills: 11 | Status: SHIPPED | OUTPATIENT
Start: 2025-03-25

## 2025-03-25 NOTE — TELEPHONE ENCOUNTER
"Called and spoke with Ana. Pt has three strengths of Rytary ordered and insurance has denied.    PA for Rytary denied. Called Karon RN who manages pt's meds at home.       Karon reports they changed insurance providers.  Rytary not on the formulary.     She reports the pt is having extreme behavior in the afternoons between 2-6 pm   Karon stated pt's  reports she's been acting out. Having OFF periods.     Confused, combative with care givers, refusing ADL's , eating peanut butter with a knife, hallucinations of children and people in the yard.     Rytary supplies:  Stopped using 245, pt is doing better with 1 cap of 95 at HS.      Rasagiline was started  1/2 tab March 10, Whole tab March 17 and behaviors became "extreme" so Karon  then decreased Rytary and behaviors improved between 11 am and 2 pm.     She had several sleepless nights before decreasing the Rytary.     Currently:  After 2 pm- 6 pm she  has paranoia and anxiety. Occasional hallucinations of children.     6 am 1 cap of 145  8 am, 11:30, 1:30, 4 pm, 6 pm 2 caps of 95  9 pm 1 cap of 95    Stopped the 245 after she was having issues.     What does Dr. Estrada recommend for the 1:30 dose?  Will need new scripts for new PA. Waiting for response from MD before pending.          "

## 2025-03-27 ENCOUNTER — OFFICE VISIT (OUTPATIENT)
Dept: NEUROLOGY | Facility: CLINIC | Age: 70
End: 2025-03-27
Payer: MEDICARE

## 2025-03-27 DIAGNOSIS — G20.B2 PARKINSON'S DISEASE WITH DYSKINESIA AND FLUCTUATING MANIFESTATIONS: ICD-10-CM

## 2025-03-27 DIAGNOSIS — G20.A1: Primary | ICD-10-CM

## 2025-03-27 DIAGNOSIS — F02.C2: Primary | ICD-10-CM

## 2025-03-27 NOTE — PATIENT INSTRUCTIONS
The MIND Diet: A Detailed Guide for Beginners    The MIND diet is designed to prevent dementia and loss of brain function as you age. It combines the Mediterranean diet and the DASH diet to create a dietary pattern that focuses specifically on brain health. This article is a detailed guide for beginners, with everything you need to know about the MIND diet and how to follow it.    What Is the MIND Diet?  MIND stands for the Mediterranean-DASH Intervention for Neurodegenerative Delay.    The MIND diet aims to reduce dementia and the decline in brain health that often occurs as people get older. It combines aspects of two very popular diets, the Mediterranean diet and the Dietary Approaches to Stop Hypertension (DASH) diet.    Many experts regard the Mediterranean and DASH diets as some of the healthiest. Research has shown they can lower blood pressure and reduce the risk of heart disease, diabetes and several other diseases. But researchers wanted to create a diet specifically to help improve brain function and prevent dementia. To do this, they combined foods from the Mediterranean and DASH diets that had been shown to benefit brain health.    For example, both the Mediterranean and DASH diets recommend eating a lot of fruit. Fruit intake has not been correlated with improved brain function, but eating berries has been. Thus, the MIND diet encourages its followers to eat berries, but does not emphasize consuming fruit in general.     Currently, there are no set guidelines for how to follow the MIND diet. Simply eat more of the 10 foods the diet encourages you to eat, and eat less of the five foods the diet recommends you limit.    10 Foods to Eat on the MIND Diet    Green, leafy vegetables: Aim for six or more servings per week. This includes kale, spinach, cooked greens and salads.  All other vegetables: Try to eat another vegetable in addition to the green leafy vegetables at least once a day. It is best to  choose non-starchy vegetables because they have a lot of nutrients with a low number of calories.  Berries: Eat berries at least twice a week. Although the published research only includes strawberries, you should also consume other berries like blueberries, raspberries and blackberries for their antioxidant benefits.  Nuts: Try to get five servings of nuts or more each week. The creators of the MIND diet dont specify what kind of nuts to consume, but it is probably best to vary the type of nuts you eat to obtain a variety of nutrients.  Olive oil: Use olive oil as your main cooking oil. Check out this article for information about the safety of cooking with olive oil.  Whole grains: Aim for at least three servings daily. Choose whole grains like oatmeal, quinoa, brown rice, whole-wheat pasta and 100% whole-wheat bread.  Fish: Eat fish at least once a week. It is best to choose fatty fish like salmon, sardines, trout, tuna and mackerel for their high amounts of omega-3 fatty acids.  Beans: Include beans in at least four meals every week. This includes all beans, lentils and soybeans.  Poultry: Try to eat chicken or turkey at least twice a week. Note that fried chicken is not encouraged on the MIND diet.  Wine: Aim for no more than one glass daily. Both red and white wine may benefit the brain. However, much research has focused on the red wine compound resveratrol, which may help protect against Alzheimers disease.    If you are unable to consume the targeted amount of servings, dont quit the MIND diet altogether. Research has shown that following the MIND diet even a moderate amount is associated with a reduced risk of Alzheimers disease. When youre following the diet, you can eat more than just these 10 foods. However, the more you stick to the diet, the better your results may be.    5 Foods to Avoid on the MIND Diet    Butter and margarine: Try to eat less than 1 tablespoon (about 14 grams) daily. Instead,  try using olive oil as your primary cooking fat, and dipping your bread in olive oil with herbs.  Cheese: The MIND diet recommends limiting your cheese consumption to less than once per week.  Red meat: Aim for no more than three servings each week. This includes all beef, pork, lamb and products made from these meats.  Fried food: The MIND diet highly discourages fried food, especially the kind from fast-food restaurants. Limit your consumption to less than once per week.  Pastries and sweets: This includes most of the processed junk food and desserts you can think of. Ice cream, cookies, brownies, snack cakes, donuts, candy and more. Try to limit these to no more than four times a week.    Researchers encourage limiting your consumption of these foods because they contain saturated fats and trans fats. Studies have found that trans fats are clearly associated with all sorts of diseases, including heart disease and even Alzheimers disease. However, the health effects of saturated fat are widely debated in the nutrition world. Although the research on saturated fats and heart disease may be inconclusive and highly contested, animal research and observational studies in humans do suggest that consuming saturated fats in excess is associated with poor brain health.      The MIND Diet May Decrease Oxidative Stress and Inflammation  The current research on the MIND diet has not been able to show exactly how it works. However, the scientists who created the diet think it may work by reducing oxidative stress and inflammation. Oxidative stress occurs when unstable molecules called free radicals accumulate in the body in large quantities. This often causes damage to cells. The brain is especially vulnerable to this type of damage. Inflammation is your bodys natural response to injury and infection. But if its not properly regulated, inflammation can also be harmful and contribute to many chronic diseases.    Together,  oxidative stress and inflammation can be quite detrimental to the brain. In recent years, theyve been the focus of some interventions to prevent and treat Alzheimers disease. Following the Mediterranean and DASH diets has been associated with lower levels of oxidative stress and inflammation. Because the MIND diet is a hybrid of these two diets, the foods that make up the MIND diet probably also have antioxidant and anti-inflammatory effects. The antioxidants in berries and the vitamin E in olive oil, green leafy vegetables and nuts are thought to benefit brain function by protecting the brain from oxidative stress. Additionally, the omega-3 fatty acids found in fatty fish are well-known for their ability to lower inflammation in the brain, and have been associated with slower loss of brain function.    The MIND Diet May Reduce Harmful Beta-Amyloid Proteins  Beta-amyloid proteins are protein fragments found naturally in the body. However, they can accumulate and form plaques that build up in the brain, disrupting communication between brain cells and eventually leading to brain cell death. In fact, many scientists believe these plaques are one of the primary causes of Alzheimers disease. Animal and test-tube studies suggest that the antioxidants and vitamins that many MIND diet foods contain may help prevent the formation of beta-amyloid plaques in the brain.    Additionally, the MIND diet limits foods that contain saturated fats and trans fats, which studies have shown can increase beta-amyloid protein levels in mices brains. Human observational studies have found that consuming these fats was associated with a doubled risk of Alzheimers disease. However, it is important to note that this type of research is not able to determine cause and effect. Higher-quality, controlled studies are needed to discover exactly how the MIND diet may benefit brain health.    A Sample Meal Plan for One Week  Making meals for  the MIND diet doesnt have to be complicated. Center your meals around the 10 foods and food groups that are encouraged on the diet, and try to stay away from the five foods that need to be limited.    Monday  Breakfast: Greek yogurt with raspberries, topped with sliced almonds.  Lunch: Mediterranean salad with olive-oil-based dressing, grilled chicken, whole-wheat dian.  Dinner: Burrito bowl with brown rice, black beans, fajita vegetables, grilled chicken, salsa and guacamole.    Tuesday  Breakfast: Wheat toast with almond butter, scrambled eggs.  Lunch: Grilled chicken sandwich, blackberries, carrots.  Dinner: Grilled salmon, side salad with olive-oil-based dressing, brown rice.    Wednesday  Breakfast: Steel-cut oatmeal with strawberries, hard-boiled eggs.  Lunch: Mexican-style salad with mixed greens, black beans, red onion, corn, grilled chicken and olive-oil-based dressing.  Dinner: Chicken and vegetable stir-turcios, brown rice.    Thursday  Breakfast: Greek yogurt with peanut butter and banana.  Lunch: Baked trout, patrick greens, black-eyed peas.  Dinner: Whole-wheat spaghetti with turkey meatballs and marinara sauce, side salad with olive-oil-based dressing.    Friday  Breakfast: Wheat toast with avocado, omelet with peppers and onions.  Lunch: Chili made with ground turkey.  Dinner: Greek-seasoned baked chicken, oven-roasted potatoes, side salad, wheat dinner roll.    Saturday  Breakfast: Overnight oats with strawberries.  Lunch: Fish tacos on whole wheat tortillas, brown rice, pollock beans.  Dinner: Chicken gyro on whole-wheat dian, cucumber and tomato salad.    Sunday  Breakfast: Spinach frittata, sliced apple and peanut butter.  Lunch: Tuna salad sandwich on wheat bread, plus carrots and celery with hummus.  Dinner: Hansen chicken, brown rice, lentils.  You can drink a glass of wine with each dinner to satisfy the MIND diet recommendations. Nuts can also make a great snack.

## 2025-04-01 ENCOUNTER — PATIENT MESSAGE (OUTPATIENT)
Dept: NEUROLOGY | Facility: CLINIC | Age: 70
End: 2025-04-01
Payer: MEDICARE

## 2025-04-01 DIAGNOSIS — G20.A1: Primary | ICD-10-CM

## 2025-04-01 DIAGNOSIS — F02.C2: Primary | ICD-10-CM

## 2025-04-15 ENCOUNTER — PATIENT MESSAGE (OUTPATIENT)
Facility: CLINIC | Age: 70
End: 2025-04-15
Payer: MEDICARE

## 2025-04-15 DIAGNOSIS — G20.B2 PARKINSON'S DISEASE WITH DYSKINESIA AND FLUCTUATING MANIFESTATIONS: Primary | ICD-10-CM

## 2025-04-15 NOTE — TELEPHONE ENCOUNTER
Pt's caregiver sent a [employer] message requesting help and information regarding the denial for Rytary. Provided info and will assist with an appeal in am.

## 2025-04-16 DIAGNOSIS — G20.B2 PARKINSON'S DISEASE WITH DYSKINESIA AND FLUCTUATING MANIFESTATIONS: ICD-10-CM

## 2025-04-16 RX ORDER — LEVODOPA AND CARBIDOPA 145; 36.25 MG/1; MG/1
CAPSULE, EXTENDED RELEASE ORAL
Qty: 30 CAPSULE | Refills: 11 | Status: SHIPPED | OUTPATIENT
Start: 2025-04-16

## 2025-04-16 RX ORDER — LEVODOPA AND CARBIDOPA 95; 23.75 MG/1; MG/1
2 CAPSULE, EXTENDED RELEASE ORAL
Qty: 330 CAPSULE | Refills: 11 | Status: SHIPPED | OUTPATIENT
Start: 2025-04-16

## 2025-04-16 NOTE — TELEPHONE ENCOUNTER
Rytary was denied. Daughter is working on insurance, but pt's  , so they've had  and business stuff to deal with.    Called to speak with Karon (caregiver, nurse) Left message. Pt's daughter was planning to call insurance and do not want to duplicate. Karon called back with updated med regimen    Called   insurance to discuss appeal. Pt has been taking Rytary since .  Rytary 2 caps 95 every two and half hours  Rytary 95 1 cap at hs   total of 11 caps per day    6 am takes Rytary  1 cap 145 mg started this after starting rasagiline. Doing 75 percent better.    Afternoons she has more hyperactivity. Not combative, doesn't want to use walker. Three minor falls, they have to follow her around almost constantly in the afternoon.    Family is glad she isn't combative but seeking advice regarding hyperactivity in afternoon.          Repended Rx consistent with current regimen                                      Subscriber ID:[id number] [employer]

## 2025-04-23 RX ORDER — BACLOFEN 5 MG/1
5 TABLET ORAL 3 TIMES DAILY
Qty: 270 TABLET | Refills: 1 | Status: SHIPPED | OUTPATIENT
Start: 2025-04-23 | End: 2025-07-22

## 2025-04-24 ENCOUNTER — PATIENT MESSAGE (OUTPATIENT)
Facility: CLINIC | Age: 70
End: 2025-04-24
Payer: MEDICARE

## 2025-05-05 ENCOUNTER — PATIENT OUTREACH (OUTPATIENT)
Dept: NEUROLOGY | Facility: CLINIC | Age: 70
End: 2025-05-05
Payer: MEDICARE

## 2025-05-05 NOTE — PROGRESS NOTES
Dementia Care Management    Recruitment Call      Date of Service: 2025  Care Navigator completing this form: Nayeli Bolton  PCP: Bela Holden MD    Patient: Michelle David  MRN: [id number]  : 1955  Age: 69 y.o.  Gender: female  Race: White  Ethnicity: Not  or /a    Objective:   Patient and caregiver potentially eligible for [facility] dementia care management programs.    CTN/SW completed outreach attempt on 2025 to assess patient/caregiver interest in the program and screen for eligibility.       Outreach Outcome:   Unable to reach dyad - Left detailed voicemail message with call back number. CTN/SW will make another attempt on 25

## 2025-05-09 ENCOUNTER — PATIENT OUTREACH (OUTPATIENT)
Dept: NEUROLOGY | Facility: CLINIC | Age: 70
End: 2025-05-09
Payer: MEDICARE

## 2025-05-09 NOTE — PROGRESS NOTES
Dementia Care Management    Recruitment Call      Date of Service: 2025  Care Navigator completing this form: Nayeli Bolton  PCP: Bela Holden MD    Patient: Michelle David  MRN: [id number]  : 1955  Age: 69 y.o.  Gender: female  Race: White  Ethnicity: Not  or /a    Objective:   Patient and caregiver potentially eligible for [facility] dementia care management programs.    CTN/SW completed outreach attempt on 2025 to assess patient/caregiver interest in the program and screen for eligibility.       Outreach Outcome:   Unable to reach dyad - Left detailed voicemail message with call back number. CTN/SW will make another attempt on 06/15/25

## 2025-05-12 ENCOUNTER — TELEPHONE (OUTPATIENT)
Facility: CLINIC | Age: 70
End: 2025-05-12
Payer: MEDICARE

## 2025-05-12 ENCOUNTER — PATIENT OUTREACH (OUTPATIENT)
Dept: NEUROLOGY | Facility: CLINIC | Age: 70
End: 2025-05-12
Payer: MEDICARE

## 2025-05-12 ENCOUNTER — OFFICE VISIT (OUTPATIENT)
Facility: CLINIC | Age: 70
End: 2025-05-12
Payer: MEDICARE

## 2025-05-12 DIAGNOSIS — R41.3 MEMORY CHANGE: ICD-10-CM

## 2025-05-12 DIAGNOSIS — G20.B2 PARKINSON'S DISEASE WITH DYSKINESIA AND FLUCTUATING MANIFESTATIONS: Primary | ICD-10-CM

## 2025-05-12 NOTE — ASSESSMENT & PLAN NOTE
Concerning for PDD given quick decline in memory atypical for standard PDism    Neuro psych revealed exensive deficits  Intake with Memory care team  Continue  Rivastigmine 1.5mg BID

## 2025-05-12 NOTE — ASSESSMENT & PLAN NOTE
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  Ontime is very short causing issues    Complex rytary schedule  6am  1 tab 145  8:30am  2 tab 95  11am  2 tab 95  1:30pm  2 tab 95  4pm  2 tab 95  6:30pm  2 tab 95  9pm  1 tab 95  Ontime 1.5H  Very brittle with Ons affecting mood- gets manic at times  Seems to have anxiety at 1.5 hrs after doses  Manic at 1PM  Suggested change 11am dose from 2 tabs rytary to 1 tab 95mg to reduce ezekiel        Also suggested add rasagiline 1mg QHS  She understands that effexor and mirtazapine and rasagiline can produce serotonin syndrome in extremely rare cases and knows to stop these medications and come to seek urgent medical care in case of this. He agrees benefits outweigh risks.    Continue mirtazapine and baclofen

## 2025-05-12 NOTE — PROGRESS NOTES
Dementia Care Management    Recruitment Call      Date of Service: 2025  Care Navigator completing this form: Nayeli Bolton  PCP: Bela Holden MD    Patient: Michelle David  MRN: [id number]  : 1955  Age: 69 y.o.  Gender: female  Race: White  Ethnicity: Not  or /a    Objective:   Patient and caregiver potentially eligible for [facility] dementia care management programs.    CTN/SW completed outreach attempt on 2025 to assess patient/caregiver interest in the program and screen for eligibility.       Outreach Outcome:   Patient/Caregiver Requested a call back. CTN/SW will make another attempt on 25 at 10 AM.  Cg called CTN but CTN was unable to talk at the time that the cg reached out. We scheduled to connect tomorrow morning.

## 2025-05-12 NOTE — TELEPHONE ENCOUNTER
Called patient to schedule follow up appt with Dr. Estrada. Patient confirmed appt time and date.

## 2025-05-12 NOTE — PROGRESS NOTES
"The patient location is: HOME  The chief complaint leading to visit is: iPD  1. Parkinson's disease with dyskinesia and fluctuating manifestations        2. Memory change            Visit type: Virtual visit with synchronous audio and video    Face to Face time with patient: 20mins  20 minutes of total time spent on the encounter, which includes face to face time and non-face to face time preparing to see the patient (eg, review of tests), Obtaining and/or reviewing separately obtained history, Documenting clinical information in the electronic or other health record, Independently interpreting results (not separately reported) and communicating results to the patient/family/caregiver, or Care coordination (not separately reported).     Each patient to whom he or she provides medical services by telemedicine is:  (1) informed of the relationship between the physician and patient and the respective role of any other health care provider with respect to management of the patient; and (2) notified that he or she may decline to receive medical services by telemedicine and may withdraw from such care at any time.      MOVEMENT DISORDERS CLINIC    PCP/Referring Provider: No referring provider defined for this encounter.  Date of Service: 5/12/2025    Chief Complaint: Pdism    Interval Hx    Contnues baclofen 5mg TID  Dyskinesias lowered  Continue mirtazapine    Since last visit,   Was prior on 3 types of rytary  Now only on  145mg  And 95mg    6am  1 tab 145  8:30am  2 tab 95  11am  2 tab 95  1:30pm  2 tab 95  4pm  2 tab 95  6:30pm  2 tab 95  9pm  1 tab 95    Ontime 1.5H    Seems to have anxiety at 1.5 hrs after doses  Manic at 1PM    Neuropsych testing revealed extensive deficits  Referred to memory care team    "priorHPI: Michelle AUDRA David is a R HANDED 69 y.o. female with a medical issues significant for Lspine dz s/p surgery, iPD coming for pt establishment. She noted 2016 L side tremors and stiffness. At that point " "mobile. 2022 still mobile. Started falling 2022 and needed a walker end of 2022 still able to walk a quarter mile. At this point she can walk 100 feet. Falls 3 times a year. Started having dyskinesias 3-4 years ago. 4 months ago notable increase to dyskinesias. 70% of the day with modest dyskiensias preventing her walking.  Has occasional L leg dystonia.    Saw a  In [place], then [facility] then [facility], then [facility] with [place]. Cobos.  Had genetic testing - PDgeneration - negative  Has a cousin with PDism  Brother with PDism    Failed PT - uncooperative    Memory is poor- comes and goes - simple conversations  Hallucinated children on the porch  Someparanoia    Has sitters year round    Medication history:  -Taking  [id number] QAM  Otherwise 2 caps 95mg q2.5H  Failed aricept - had diarrhea    Neuroleptic exposure:  None    Has incontinence    PD Review of Symptoms:  Anosmia: yes  Dysarthria/Hypophonia: yes  Dysphagia/Sialorrhea: mild  Depression: none  Cognitive slowing: poor  Hallucinations: yes  Urinary changes: -  Constipation: yes  Orthostasis: none  Dyskinesia: severe  Falls: yes  Freezing: none  Micrographia: yes  Sleep issues:  -RBD: none"    Review of Systems:   Review of Systems   Constitutional:  Negative for fever.   HENT:  Negative for congestion.    Eyes:  Negative for double vision.   Respiratory:  Negative for cough and shortness of breath.    Cardiovascular:  Negative for chest pain and leg swelling.   Gastrointestinal:  Negative for nausea.   Genitourinary:  Negative for dysuria.   Musculoskeletal:  Positive for falls.   Skin:  Negative for rash.   Neurological:  Positive for tremors and speech change. Negative for headaches.   Psychiatric/Behavioral:  Positive for hallucinations and memory loss. Negative for depression.          Current Medications:  Outpatient Encounter Medications as of 5/12/2025   Medication Sig Dispense Refill    ACETAMINOPHEN ORAL Take 1 tablet by mouth once daily.      " amLODIPine (NORVASC) 2.5 MG tablet Take 2.5 mg by mouth once daily.      baclofen (LIORESAL) 5 mg Tab tablet Take 1 tablet (5 mg total) by mouth 3 (three) times daily. 270 tablet 01    carbidopa-levodopa (RYTARY) 23.75-95 mg CpSR Take 2 capsules by mouth 5 (five) times daily. At 08:30, 1600, and 18:30 take two caps and take one cap at 2100 (9 pm) 330 capsule 11    carbidopa-levodopa (RYTARY) 36. mg CpSR Take 1 capsule at 6 am 30 capsule 11    celecoxib (CELEBREX) 100 MG capsule Take 100 mg by mouth 2 (two) times a day.      cholecalciferol, vitamin D3, 125 mcg (5,000 unit) Tab Take 1 tablet by mouth every morning.      cholecalciferol, vitamin D3, 125 mcg (5,000 unit) Tab Take 5,000 Units by mouth once daily.      cranberry fruit concentrate (AZO CRANBERRY ORAL) Take by mouth 2 (two) times a day.      FA/mv,Ca,iron,min/lycopene/lut (MULTIVITAL ORAL) Take by mouth once daily.      gabapentin (NEURONTIN) 100 MG capsule Take 100 mg by mouth 3 (three) times daily.      HYDROcodone-acetaminophen (NORCO) 5-325 mg per tablet Take 1 tablet by mouth every 12 (twelve) hours as needed for Pain.      Lactobacillus acidophilus (PROBIOTIC) 10 billion cell Cap Take 50,000 Units by mouth once daily.      LORazepam (ATIVAN) 0.5 MG tablet Take 1 mg by mouth every evening.      lubiprostone (AMITIZA) 24 MCG Cap Take 24 mcg by mouth 2 (two) times daily with meals.      metoprolol succinate (TOPROL-XL) 50 MG 24 hr tablet Take 1 tablet by mouth once daily.      mirtazapine (REMERON) 15 MG tablet Take 15 mg by mouth every evening.      polyethylene glycol (GLYCOLAX) 17 gram/dose powder Take 17 g by mouth once daily.      rasagiline (AZILECT) 1 mg Tab Take 1 tablet (1 mg total) by mouth every evening. 30 tablet 12    rivastigmine tartrate (EXELON) 1.5 MG capsule Take 1 capsule (1.5 mg total) by mouth 2 (two) times daily. 60 capsule 11    venlafaxine (EFFEXOR-XR) 150 MG Cp24 Take 150 mg by mouth once daily.      venlafaxine 75 mg TR24        [DISCONTINUED] baclofen (LIORESAL) 5 mg Tab tablet Take 1 tablet (5 mg total) by mouth 3 (three) times daily. 270 tablet 0    [DISCONTINUED] carbidopa-levodopa (RYTARY) 23.75-95 mg CpSR Take 2 capsules by mouth 5 (five) times daily. At 08:30, 1600, and 18:30 take two caps and take one cap at 2100 (9 pm) 210 capsule 11    [DISCONTINUED] carbidopa-levodopa (RYTARY) 36. mg CpSR Take 1 capsule at 6 am and two capsules at 1:30 pm 90 capsule 11     No facility-administered encounter medications on file as of 5/12/2025.       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    Social:  Social History     Socioeconomic History    Marital status:      Social Drivers of Health     Financial Resource Strain: Low Risk  (2/25/2025)    Overall Financial Resource Strain (CARDIA)     Difficulty of Paying Living Expenses: Not hard at all   Food Insecurity: No Food Insecurity (2/25/2025)    Hunger Vital Sign     Worried About Running Out of Food in the Last Year: Never true     Ran Out of Food in the Last Year: Never true   Transportation Needs: No Transportation Needs (2/25/2025)    PRAPARE - Transportation     Lack of Transportation (Medical): No     Lack of Transportation (Non-Medical): No   Physical Activity: Inactive (2/25/2025)    Exercise Vital Sign     Days of Exercise per Week: 0 days     Minutes of Exercise per Session: 0 min   Stress: Stress Concern Present (2/25/2025)    [employer] - Occupational Stress Questionnaire     Feeling of Stress : To some extent   Housing Stability: Low Risk  (2/25/2025)    Housing Stability Vital Sign     Unable to Pay for Housing in the Last Year: No     Number of Times Moved in the Last Year: 0     Homeless in  the Last Year: No       Family History:  No family history on file.    PHYSICAL:  There were no vitals taken for this visit.    Physical Exam  Constitutional: Well-developed, well-nourished, appears stated age  Eyes: No scleral icterus  ENT: Moist oral mucosa  Cardiovascular: No lower extremity edema   Respiratory: No labored breathing   Skin: No rash   Hematologic: No bruising    Other: GI/ deferred   Mental status: Alert and oriented to person, place, time, and situation;   Mild confusion    follows commands  Speech: normal (not dysarthric), no aphasia  Cranial nerves:            CN II: Pupils mid-position and equal, not tested light or accommodation  CN III, IV, VI: Extraocular movements full, no nystagmus visualized  CN V: Not tested   CN VII: Face strong and symmetric bilaterally   CN VIII: Hearing intact to voice and conversation   CN IX, X: Palate raises midline and symmetric   CN XI: Strong shoulder shrug B/L  CN XII: Tongue appears midline   Motor: Normal bulk by appearance, no drift   Sensory: Not tested    Gait: mod shuffle b/l armswing decrease  Deep tendon reflexes: Not tested  Movement/Coordination                    severe hypophonic speech.                     mod facial masking.  Mod to severe bradykinesia.               Mod shoulder dyskiensias    General Medical Examination:  General: Good hygiene, appropriate appearance.  HEENT: Normocephalic, atraumatic.   Neck: Supple.   Chest: Unlabored breathing.   CV: Symmetric pulses.   Ext: No clubbing, cyanosis, or edema.     Mental Status:  Mood/Affect: Appropriate/congruent.  Level of consciousness: Awake, alert.  Orientation: Not oriented to hospital floor or president  Some mildly coherent  Language: No Dysarthria    Cranial nerves:  I: Not tested  II: PERRL, VFF to counting  III, IV, VI: EOMI with conjugate gaze and no nystagmus on end gaze  V: Facial sensation intact and symmetric over the bilateral V1-V3  VII: Facial muscle activation intact and  "symmetric over the bilateral upper and lower face  VIII: Hearing intact in the b/l ears and symmetrical to finger rub  IX, X, XII: TUP midline - no atrophy or fasiculations  X: SCMs and shoulder shrug full strength b/l and symmetric    Motor:   -UE: 5/5 deltoids; 5/5 biceps, triceps; 5/5 wrist flexors, extensors; 5/5 interosseous; 5/5   -LEs: 5/5 hip flexion, extension; 5/5 knee flexion, extension; 5/5 ankle flexion, extension    Mod hypomimia  Severe ballistic chorea    DTRs:  ? Biceps Triceps Brachioradialis Knee Ankle   Left 2+ 2+ 2+ 2+    Right 2+ 2+ 2+ 2+      Cannot perform finger taps consistently    Neck tone: nl  ? Arm Leg   Left 1+ 1+   Right 1+ 1+     Sensation:   -Light touch: Intact and symmetric in the bilateral upper and lower extremities.    Coordination:   -Finger to nose:nl    Gait:  Large ballistic chorea   Walks with great assist good stride length"    Laboratory Data:  NA    Imaging:  NA    Neuropsych  Ms. Michelle David is a 69 y.o., female with medical history including dementia 2/2 idiopathic Parkinson's Disease (onset 2016), atrial fibrillation, anxiety, depression, and family history of parkinsonism who was referred for a neuropsychological evaluation. History was primarily provided by daughter and pt's . They report onset of physical symptoms starting in 2016. Prior neuropsych evaluation at the [facility] in 2023 found dementia that rendered her inappropriate for DBS. Today Ms. David is largely nonverbal, but her  reports sudden instances of improved cognition lasting minutes. She is dependent for all iADLs and ADLs. Family reports restlessness, hallucinations, paranoia, and compulsive behaviors. Sleep is good though she snores and has untreated sleep apnea. She uses a walker and has increased falls. Family has sitters and locks/alarms on doors. Most-recent brain MRI showed mild chronic ischemic changes, general volume loss, and hippocampal volume loss. Reversible " labs are WNL. Anticholinergic burden is elevated (4; celecoxib, Ativan, Mirtazapine, Venlafaxine).      As expected, Ms. David's testing suggests extensive impairments. She struggled to understand both the test instructions and the testing situation more broadly. Her verbal communication was very limited, frequently unintelligible, with phonemic paraphasic errors and neologisms. She often provided no responses at all. She was perseverative with some stimulus-bound behavior. On receptive language testing, even when a verbal response was not required, her scores were well below normal.      Her overall presentation is most consistent with advanced dementia due to Parkinson's disease. Due to global impairment, test results are not helpful for differentiating between dementia due to iPD vs PD+. Although it sounds like relatively extensive deficits were also noted during 2023 evaluation, she appears to be continuing to decline fairly rapidly, as she was at least able to complete testing in 2023 and during our evaluation testing was essentially impossible due to the extent of her deficits. It's unclear exactly when her cognitive symptoms began relative to physical symptoms, but if she was at a dementia level by 2023 I think it's reasonable to imagine symptoms began at least as early as 2020, possibly earlier.      The family would like to be connected with caregiver support services to help them cope with the progression of Ms. De Lunas symptoms and prepare for end-of-life care. Will refer to our dementia care management program. We will also discuss referral to palliative care during feedback.         Assessment//Plan:   Problem List Items Addressed This Visit          Neuro    Parkinson's disease with dyskinesia and fluctuating manifestations - Primary    Current Assessment & Plan     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  Ontime is very short causing issues    Complex rytary  schedule  6am  1 tab 145  8:30am  2 tab 95  11am  2 tab 95  1:30pm  2 tab 95  4pm  2 tab 95  6:30pm  2 tab 95  9pm  1 tab 95  Ontime 1.5H  Very brittle with Ons affecting mood- gets manic at times  Seems to have anxiety at 1.5 hrs after doses  Manic at 1PM  Suggested change 11am dose from 2 tabs rytary to 1 tab 95mg to reduce ezekiel        Also suggested add rasagiline 1mg QHS  She understands that effexor and mirtazapine and rasagiline can produce serotonin syndrome in extremely rare cases and knows to stop these medications and come to seek urgent medical care in case of this. He agrees benefits outweigh risks.    Continue mirtazapine and baclofen         Memory change    Current Assessment & Plan   Concerning for PDD given quick decline in memory atypical for standard PDism    Neuro psych revealed exensive deficits  Intake with Memory care team  Continue  Rivastigmine 1.5mg BID                This visit covered ongoing complex medical needs extending over multiple office visits covering multiple chronic issues.      Vi Estrada MD, MS  [facility]  Department of Neurology  Movement Disorders

## 2025-05-13 ENCOUNTER — PATIENT OUTREACH (OUTPATIENT)
Dept: NEUROLOGY | Facility: CLINIC | Age: 70
End: 2025-05-13
Payer: MEDICARE

## 2025-05-13 NOTE — PROGRESS NOTES
Dementia Care Management    Recruitment Call      Date of Service: 2025  Care Navigator completing this form: Nayeli Bolton  PCP: Bela Holden MD    Patient: Michelle David  MRN: [id number]  : 1955  Age: 69 y.o.  Gender: female  Race: White  Ethnicity: Not  or /a    Objective:   Patient and caregiver potentially eligible for [facility] dementia care management programs.    CTN/SW completed outreach attempt on 2025 to assess patient/caregiver interest in the program and screen for eligibility.     Outreach Outcome:   Outreach Successful - Pre-Enrollment Call scheduled for 25 at 10:00 AM   Karon Hutson is the pt's care manager / private nurse - pt and cg have 4 adult children, and 1  who is hard of hearing. Cg's 2 daughters La [place] and Rox Patel are both POA. CTN will share information with the cg and the POA via my chart.

## 2025-05-15 ENCOUNTER — PATIENT MESSAGE (OUTPATIENT)
Dept: NEUROLOGY | Facility: CLINIC | Age: 70
End: 2025-05-15
Payer: MEDICARE

## 2025-05-15 ENCOUNTER — PATIENT MESSAGE (OUTPATIENT)
Facility: CLINIC | Age: 70
End: 2025-05-15
Payer: MEDICARE

## 2025-05-15 ENCOUNTER — TELEPHONE (OUTPATIENT)
Facility: CLINIC | Age: 70
End: 2025-05-15
Payer: MEDICARE

## 2025-05-15 DIAGNOSIS — G20.B2 PARKINSON'S DISEASE WITH DYSKINESIA AND FLUCTUATING MANIFESTATIONS: ICD-10-CM

## 2025-05-15 RX ORDER — LEVODOPA AND CARBIDOPA 95; 23.75 MG/1; MG/1
CAPSULE, EXTENDED RELEASE ORAL
Qty: 330 CAPSULE | Refills: 11 | Status: SHIPPED | OUTPATIENT
Start: 2025-05-15

## 2025-05-15 NOTE — TELEPHONE ENCOUNTER
----- Message from Emma Brooke sent at 5/15/2025 12:55 PM CDT -----  Regarding: Advise  Contact: [phone]  Name of Caller: [facility] Name: carbidopa-levodopa (RYTARY) 23.75-95 mg CpSR Pharmacy Name: [facility] - [address] [address]Xnboa: [phone] Fax: [phone] Information: [facility] is calling stating that medication is not covered by patient's insurance and she wants an update on if office is working on prior authorization for medication or not. Please give pharmacy a call.

## 2025-05-15 NOTE — TELEPHONE ENCOUNTER
Received corrected timing for Rytary and repended rx. Continue to have difficulty getting prescription filled due to insurance.

## 2025-05-15 NOTE — TELEPHONE ENCOUNTER
Received message from pharmacy and pt's caregiver regarding rytary. Last PA closed. Reordered with clarification of timing and routed to Ana

## 2025-05-16 ENCOUNTER — PATIENT MESSAGE (OUTPATIENT)
Facility: CLINIC | Age: 70
End: 2025-05-16
Payer: MEDICARE

## 2025-05-22 ENCOUNTER — PATIENT OUTREACH (OUTPATIENT)
Dept: NEUROLOGY | Facility: CLINIC | Age: 70
End: 2025-05-22
Payer: MEDICARE

## 2025-05-22 ENCOUNTER — PATIENT MESSAGE (OUTPATIENT)
Dept: NEUROLOGY | Facility: CLINIC | Age: 70
End: 2025-05-22
Payer: MEDICARE

## 2025-05-22 DIAGNOSIS — F03.90 DEMENTIA, UNSPECIFIED DEMENTIA SEVERITY, UNSPECIFIED DEMENTIA TYPE, UNSPECIFIED WHETHER BEHAVIORAL, PSYCHOTIC, OR MOOD DISTURBANCE OR ANXIETY: Primary | ICD-10-CM

## 2025-05-22 NOTE — PROGRESS NOTES
Dementia Care Management  Pre-Enrollment Call        Virtual Visit - Telehealth  The patient location is: Home  The chief complaint leading to consultation is: GUIDE Model Program  Visit type: Audio Only       Date of Service: 2025    Care Navigator completing this form: Nayeli Bolton  Referring Provider: Referring Provider: Vi Estrada MD   PCP: Bela Holden MD  Care Team: Patient Care Team:  Bela Holden MD as PCP - General (Internal Medicine) Vi Estrada MD   Are there any other specialty providers that we should be aware of? Yes - Vi Estrada MD Neurology, Dr. José Miguel Elliott [facility]   *If yes, add specialty to Care Team in [facility].    Patient: Michelle David  MRN: [id number]  : 1955  Age: 69 y.o.  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     Objective: Dementia Care Management Pre-Enrollment Call.    Michelle David is a 69 y.o. female who presents for Dementia Care Management Pre-Enrollment Call. Spoke to Caregiver Karon Hutson (Private RN and ).      Visit Notes:  Pre-Enrollment Call Completed: Pre-enrollment surveys were documented in Epic flowsheets and Initial Visit was scheduled for 25 at 11 AM.        Psychosocial History   Primary Caregiver: Karon Hutson   Family Status: Pt is in her second marriage, her first  and father of her children just passed away. She has 4 adult children, all in professional careers and are business owners. All of the kids have infrequent visits to the home but are responsive to the pt's needs if contacted. The adult kids only care for their mother and not their step father.   Current Living situation: spouse, and  care givers - there is typically 1 Monday- Friday caregiver days and nights and weekends are shared amongst agency care givers. The  pt's children manage the hired caregivers.  Support system: Karon Hutson (RN), hired caregivers, spouse, pt's children visit infrequently but are available they stop by 1-2 x per month. Pt's sister visit's almost daily.   Patient's hobbies/interests: Pt used to be an artist and play the piano she does not do that anymore but is sometimes interested in watching or listening. Pt likes to read the newspaper or Faith or gandering material. Pt likes to look at photo albums. Pt likes to take walks out side and sit on the porch. Cg will go get her hair done weekly, and get her nails done every month.       Caregiver Needs and Resource Assessment     Is the caregiver willing and able to assume, or continue, to provide assistance: Yes    Health Literacy:    Dementia Knowledge: how to respond to situations in crisis      Caregiver needs and wellbeing: Cg reports that the pt's  gets anxious and wants to help but he doesn't know how to respond to the pt sometimes. Cg reports that the  has a very anxious personality. Cg reports that the  has this idea that he can fix the pt. Cg states that the pt's  has another home 2 miles away but he is too nervous to move into there and is very attached and concerned about the pt's health and well being.     List of resources that are currently available to the dyad (ex. Enrolled in community programs, sitter services, home health, etc.): support group       Surveys (reference Additional Document section for survey attachments)   Health Related Social Needs - [facility]-HRSN Tool  Instrumental Activities of Daily Living/Activities of Daily Living  Dementia Safety Assessment  Behave 5+ (Neuropsychiatric Symptoms)  Zarit [place] Interview (22 items)  Global Health PROMIS-10  Pre-Visit Form

## 2025-06-02 ENCOUNTER — PATIENT MESSAGE (OUTPATIENT)
Dept: NEUROLOGY | Facility: CLINIC | Age: 70
End: 2025-06-02
Payer: MEDICARE

## 2025-07-01 ENCOUNTER — PATIENT MESSAGE (OUTPATIENT)
Dept: NEUROLOGY | Facility: CLINIC | Age: 70
End: 2025-07-01

## 2025-07-01 ENCOUNTER — OFFICE VISIT (OUTPATIENT)
Dept: NEUROLOGY | Facility: CLINIC | Age: 70
End: 2025-07-01
Payer: MEDICARE

## 2025-07-01 DIAGNOSIS — G20.A1: ICD-10-CM

## 2025-07-01 DIAGNOSIS — F02.C2: ICD-10-CM

## 2025-07-01 PROCEDURE — G0520 PR MGMT NEW PT-CAREGIVER DYAD, WITH DEMENTIA, MODERATE COMPLEXITY: HCPCS | Mod: 95,,, | Performed by: STUDENT IN AN ORGANIZED HEALTH CARE EDUCATION/TRAINING PROGRAM

## 2025-07-01 RX ORDER — NITROFURANTOIN 25; 75 MG/1; MG/1
100 CAPSULE ORAL 2 TIMES DAILY
COMMUNITY
Start: 2025-06-30

## 2025-07-01 RX ORDER — ACETAMINOPHEN 500 MG
1 TABLET ORAL EVERY MORNING
COMMUNITY

## 2025-07-01 NOTE — PROGRESS NOTES
"  GUIDE Dementia Care Program   Physician Initial Assessment       Virtual Visit - Telehealth  The patient location is: [place] - [place]  The chief complaint leading to consultation is: GUIDE Model Program  Visit type: Audiovisual    Date of Service: 2025    Patient: Michelle David  MRN: [id number]  : 1955  Age: 70 y.o.  Gender: female    Care Team: Patient Care Team:  Bela Holden MD as PCP - General (Internal Medicine)  Nayeli Bolton as Care Manager  Isha Adams MD as Consulting Physician (Internal Medicine)     ASSESSMENT - CARE MANAGEMENT PLAN & RECOMMENDATIONS     Michelle Amador" was seen today for dementia.    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 Refer to note by Karen Lemus PsyD  on date 3/5/25.   -Interference with independence in functional abilities, patient is currently ADL: dependent  and IADL dependent    -This cognitive deficits are not explained by delirium or major psychiatric disorder  Current stage:  CDR:3  CMS GUIDE Complexity Tier: Moderate Complexity Dyad - First 6M -    -Pt lives with  and with 24/7 assistance at home receiving an appropriate level of care   -CTN to discuss joining socially and cognitively engaging activities while avoiding excessive stimulation, fatigue, or overly complex situations.   -CTN to discuss integrating routine and schedule.  -CTN to discuss MIND Diet and other lifestyle modifications that may help maintain brain health   -CTN to provide reading material   -CTN to discuss measures to ensure safety at " home  -CTN to provide information on community-based services  -See advanced care planning below      What Matters Most  Advance Care Planning   Advance Directives:   Living Will: No    [place]: No      Decision Making:  Family answered questions  Goals of Care: 7/1/2025  The caregiver endorses that what is most important right now is to focus on spending time at home. Patient's daughter is her Power of . Patient has a  (Karon) who manages all her medical affairs. Patient has a living will, which was recently discussed with her daughter The daughter was supposed to review the living will and clarify the patient's code status, but has not yet provided this information. Patient has four adult children from her first marriage who are available. Advanced care planning documents are incomplete or not fully clarified at this time.         Medications  -Medication reconciliation performed.   -Total Active High Risk Medications: 3  -gabapentin - 100 MG  LORazepam - 0.5 MG  venlafaxine Tr24 - 75 mg  - Reviewed current medication regimen and identified opportunities for optimization to address balance issues and fall risk.  - Decreased baclofen from 5 mg 3 times daily (TID) to 5 mg twice daily (BID) (morning and night) to assess impact on balance. Instructed to observe response to baclofen dose reduction for at least 1 week before considering further medication changes.  - Suggested future consideration of gabapentin dose reduction or timing adjustment to nighttime administration after baclofen changes.  -Proposed decreasing lorazepam to 0.25 mg and increasing mirtazapine to improve sleep while reducing fall risk.      Mobility  -Falls yes  - Mobility Device: walker  -Discussed the benefits of gradual exercise.   - Patient to continue walking around the house with walker and outdoor walks on the driveway when weather permits.    Reviewed current medication regimen and identified opportunities for  optimization to address balance issues and fall risk.    Nutrition  -BMI: There is no height or weight on file to calculate BMI.   -Weight loss: yes  -Frailty:yes      Caregiver Assessment  -Caregiver:   -Zarit [place] Scale Score: 22  -CTN to continue to provide caregiver support         HISTORY OF PRESENT ILLNESS     Patient is a 70 y.o. year old female presenting with Dementia,  2/2 idiopathic Parkinson's Disease (onset 2016), atrial fibrillation, anxiety, depression,  has no past medical history on file. Here for Dementia Care evaluation.    Informant: Patient's history was obtained from Karon, her /, and Diogo, her spouse.    Patient is  requiring 24/7 care at home. She experienced a fall this past Sunday, resulting in an emergency room visit. The fall occurred while using her rollator with caregiver assistance, possibly due to tripping on a rug. She hit the left side of her mouth and cheek during the fall. At the ER, blood was found in her urine, leading to a diagnosis of a UTI for which she was prescribed Macrobid. Patient has had no fever or other symptoms related to the UTI.    Patient has experienced unintentional weight loss, losing about five lbs in the last two months. Her eating patterns have changed, requiring more assistance and cues during meals. She shows a preference for fruits and vegetables, but efforts are being made to incorporate higher protein snacks into her diet.    Patient uses a wheelchair when leaving the house and a walker inside, though she sometimes refuses to use the walker. Walks quite a bit around the house with a walker. She lives in a multi-story home but has support and only needs to navigate about three steps to reach the master suite. Her mobility has declined, and she no longer performs any self-care tasks independently. When weather permits, caregivers take her for walks on the large, half-[place] driveway outside.    Patient's mood and  behavior have been concerning. She can be hostile at times, resisting care, and displaying crankiness and impatience. She experiences mood changes and occasionally has delusions and hallucinations. Her Venlafaxine dosage was recently adjusted due to increased defiance and acting out when the dose was lowered.    Patient has been having difficulty with medication compliance, often chewing her medications instead of swallowing them whole. The caregivers have been instructed to prioritize her primary medicines and be more flexible with supplements if she refuses them.    Requires full-time care, with 24/7 assistance at home No longer able to perform any activities independently  Needs assistance with feeding; sometimes feeds herself but requires more cues and assistance recently    Has difficulty following directions to participate in physical therapy or exercise classes  Uses hand weights at home with assistance from caregivers  Sleep difficulties addressed with medication    [facility]     Cognition -summary  MOCA   Depression: yes  Anxiety: yes  See BEHAVE 5 flowsheet below    Safety assessment - summary  Substance Use: no  Suicidal ideation: no  Driving: no   Falls: yes - last weekend  Wandering:no   Abuse, neglect, and exploitation: no  See DSA (Dementia Safety Assessment) flowsheet score below      Psychosocial History: Please see Care Navigators note for more details.  Pt has 24 hour care with sitters, nurse, , and daughter.  Karon AUGUSTIN/OLEG 2nd marriage 4 adult children   Svitlana POA    Review of Systems   Review of Systems      GUIDE Self-Report Measures         5/22/2025   Dementia BEHAV5+   Does you care recipient get angry or hostile? Yes   Resist care from others? Yes   Does your care care recipient see and/ or hear things that no one else can see or hear? Yes   Does your care recipient act impatiently and cranky? Yes   Does his or her mood frequently change for no apparent reason? Yes   Does  your care recipient act suspicious without good reason (Example: believes that others are stealing from him or her, or planning to harm him or her in some way)? Yes   Does your care recipient seem less interested in his or her usual activities or in the activities and plans of others? Yes   Does your care recipient have trouble sleeping at night? No         2025 7/3/2025 2025   Dementia Assessment   ZBI-22  40    Falls 0  1   UTIs 0  1   ED Visits 0  1   Hospital Encounters 0  0         2025   DC MONTHLY TRACKING   Falls 0 1 0   UTIs 0 1 0   ED Visits 0 1    Hospital Encounters 0 0 0       4Ms for Medical Decision-Making in Older Adults    MEDICATIONS:  High Risk Medications:  Total Active Medications: 3  gabapentin - 100 MG  LORazepam - 0.5 MG  venlafaxine Tr24 - 75 mg    MOBILITY:  Activities of Daily Livin/22/2025    10:12 AM   Activities of Daily Living   Ambulation Assistance Required   Ambulation Assistance Walker (wheeled)   Dressing Assistance Required   Dressing Assistance Moderate   Number of people 1   Transfers Assistance Required   Transfers Assistance Minimum   Toileting Incontinent of bladder;Incontinent of bowel   Toileting Assistance Wears Briefs   Feeding Assistance Required   Feeding Assistance Minimum   Cleaning home/Chores Assistance Required   Telephone use Assistance Required   Shopping Assistance Required   Paying bills Assistance Required   Taking meds Assistance Required   If required, who assists the patient with ADLs? Caregiver, pt's spouse, and their 24 hour live in care     Fall Risk:      3/3/2025    10:00 AM   Fall Risk Assessment - Outpatient   Mobility Status Ambulatory   Number of falls 1   Identified as fall risk False     Disability Status:      2025     2:23 PM   Disability Status   Are you deaf or do you have serious difficulty hearing? N   Are you blind or do you have serious difficulty seeing, even when wearing glasses? N    Because of a physical, mental, or emotional condition, do you have serious difficulty concentrating, remembering, or making decisions? Y   Do you have serious difficulty walking or climbing stairs? Y   Do you have difficulty dressing or bathing? Y   Because of a physical, mental, or emotional condition, do you have difficulty doing errands alone such as visiting a doctor's office or shopping? Y     Nutrition Screenin/1/2025     2:26 PM   Nutrition Screening   Has food intake declined over the past three months due to loss of appetite, digestive problems, chewing or swallowing difficulties? Moderate decrease in food intake   Involuntary weight loss during the last 3 months? Weight loss between 1 and 3 kg (2.2 and 6.6 pounds)   Mobility? Able to get out of bed/chair, but does not go out   Has the patient suffered psychological stress or acute disease in the past three months? No   Neuropsychological problems? Mild dementia   Body Mass Index (BMI)?  BMI 21 to less than 23   Screening Score 9   Interpretation At risk of malnutrition    Screening Score: 0-7 Malnourished, 8-11 At Risk, 12-14 Normal  Get Up and Go:       No data to display              Whisper Test:       No data to display                    MENTATION:   Has Dementia Dx: Yes    Has Anxiety Dx: No        Medication Reconciliation     Current Medications[1]    Review of patient's allergies indicates:   Allergen Reactions    Quetiapine Other (See Comments)     Syncope episode    Donepezil Diarrhea and Nausea And Vomiting         Physical Exam        Physical Exam  Constitutional:       General: She is not in acute distress.     Appearance: She is not toxic-appearing.   HENT:      Head: Normocephalic.      Right Ear: External ear normal.      Left Ear: External ear normal.      Mouth/Throat:      Mouth: Mucous membranes are moist.   Eyes:      General: No scleral icterus.     Extraocular Movements: Extraocular movements intact.   Pulmonary:       Effort: No respiratory distress.   Skin:     Coloration: Skin is not pale.   Neurological:      Mental Status: She is alert. Mental status is at baseline. She is disoriented.              No data to display                    Thank you for allowing us to participate in the care of your patient. Please do not hesitate to contact the GUIDE team with any questions or concerns.         Signature: Isha Adams MD         [1]   Current Outpatient Medications:     ACETAMINOPHEN ORAL, Take 1 tablet by mouth once daily., Disp: , Rfl:     amLODIPine (NORVASC) 2.5 MG tablet, Take 2.5 mg by mouth once daily., Disp: , Rfl:     carbidopa-levodopa (RYTARY) 23.75-95 mg CpSR, Take 2 capsules by mouth 5 (five) times daily AND 1 capsule every evening. Take two caps 5 times per day at 0830, 11 am, 1330, 1600, 1830 AND 1 cap  at 2100., Disp: 330 capsule, Rfl: 11    carbidopa-levodopa (RYTARY) 36. mg CpSR, Take 1 capsule at 6 am, Disp: 30 capsule, Rfl: 11    celecoxib (CELEBREX) 100 MG capsule, Take 100 mg by mouth 2 (two) times a day., Disp: , Rfl:     cholecalciferol, vitamin D3, 125 mcg (5,000 unit) Tab, Take 1 tablet by mouth every morning., Disp: , Rfl:     FA/mv,Ca,iron,min/lycopene/lut (MULTIVITAL ORAL), Take by mouth once daily., Disp: , Rfl:     gabapentin (NEURONTIN) 100 MG capsule, Take 100 mg by mouth 3 (three) times daily., Disp: , Rfl:     Lactobacillus acidophilus (PROBIOTIC) 10 billion cell Cap, Take 50,000 Units by mouth once daily., Disp: , Rfl:     LORazepam (ATIVAN) 0.5 MG tablet, Take 1 mg by mouth every evening., Disp: , Rfl:     lubiprostone (AMITIZA) 24 MCG Cap, Take 24 mcg by mouth 2 (two) times daily with meals., Disp: , Rfl:     metoprolol succinate (TOPROL-XL) 50 MG 24 hr tablet, Take 1 tablet by mouth once daily., Disp: , Rfl:     mirtazapine (REMERON) 15 MG tablet, Take 15 mg by mouth every evening., Disp: , Rfl:     nitrofurantoin, macrocrystal-monohydrate, (MACROBID) 100 MG capsule, Take 100  mg by mouth 2 (two) times daily., Disp: , Rfl:     polyethylene glycol (GLYCOLAX) 17 gram/dose powder, Take 17 g by mouth once daily., Disp: , Rfl:     rasagiline (AZILECT) 1 mg Tab, Take 1 tablet (1 mg total) by mouth every evening., Disp: 30 tablet, Rfl: 12    rivastigmine tartrate (EXELON) 1.5 MG capsule, Take 1 capsule (1.5 mg total) by mouth 2 (two) times daily., Disp: 60 capsule, Rfl: 11    venlafaxine 75 mg TR24, , Disp: , Rfl:     acetaminophen (TYLENOL) 500 MG tablet, Take 1 tablet by mouth every morning., Disp: , Rfl:     cranberry fruit concentrate (AZO CRANBERRY ORAL), Take by mouth 2 (two) times a day., Disp: , Rfl:

## 2025-07-01 NOTE — PROGRESS NOTES
GUIDE Patient Assessment and Alignment Form     Results from (check one):  [x] Initial Assessment  If Initial Assessment, is this patient an existing patient of the practice or a new patient?   [] Existing patient  [x] New patient    If Initial Assessment, provide a patient referral source:  [x] Referred by a health care provider  [] Referred by a community-based organization  [] Self-referral  [] Re-assessment  If Re-assessment, provide reason:  [] Annual re-assessment  [] Re-assessment due to change in severity of patient's dementia  [] Re-assessment due to change in caregiver status  If Re-assessment due to change in caregiver status, please specify the reason for change:  [] New primary caregiver  [] Loss of caregiver so patient is without a caregiver  [] Patient change in residence  [] Other ___________________    Date of current assessment: 07/01/2025         Patient name: Michelle David  Patient middle name initial (if applicable): AUDRA  Patient last name: Frankie  Patient Address: [address] [place] [address]   Email: [email or web address]   Phone Number: [phone]   [x] Check if mobile/cellular phone    Patient resides in:   [x] Private residence  [] Assisted living facility  [] Memory care program (excludes nursing home level of care)         [x] Check box to confirm patient is not a long-term nursing home resident.     Patient YOB: 1955  Patient Medicare Beneficiary Identifier: [id number]   Patient Medicaid ID number (if applicable):    Patient dementia stage:   CDR: 3  FAST: DC FAST STAGING   Patient PROMIS Global Health: yes  Does patient have a primary care provider: [x] Yes  [] No    Does patient have caregiver, defined as a relative, or an unpaid nonrelative, who assists the beneficiary with activities of daily living and/or instrumental activities of daily living? Available Caregiver(s): Yes - Multiple    If yes:   Primary caregiver first name: Diogo    Primary caregiver  last name: Frankie   Primary caregiver email: [email or web address]  Primary caregiver phone Number: [phone]  [x] Check if mobile/cellular phone  Primary caregiver YOB: 1951  Primary caregiver sex: Male     What is primary caregiver's relationship to patient? Spouse     Does primary caregiver live with patient? Yes     Is primary caregiver a Medicare beneficiary? Yes  If yes, what is the primary caregiver's Medicare Beneficiary Identifier: [id number]    Primary caregiver Zarit [place] Interview: 40  How long has the primary caregiver been in their caregiver role?  (enter length of time in months and years)   3 years     In my clinical judgment, the assessed patient meets the National [place] on Aging-Alzheimer's Association diagnostic guidelines for dementia and/or the DSM-5 diagnostic guidelines for major neurocognitive disorder, or I have received a written report (electronic or hard-copy) of a documented dementia diagnosis from another Medicare qualified health professional.    [] Yes, the patient meets the National [place] on Aging-Alzheimer's Association diagnostic guidelines for dementia and/or the DSM-5 diagnostic guidelines for major neurocognitive disorder.  [x] Yes, I received a written report of a documented dementia diagnosis  [] No, I cannot attest to either statement      Attesting clinician:   First name: Dr. Vieira  Middle Name: n/a  Last name: Panda  National Provider Identification (NPI) number for attesting clinician: [id number]  GUIDE Model Participant Taxpayer Identification Number TIN: [id number]

## 2025-07-01 NOTE — PROGRESS NOTES
Dementia Care Program - GUIDE  Care Navigator - Initial Assessment       Virtual Visit - Telehealth  The patient location is: Home  The chief complaint leading to consultation is: GUIDE Model Program  Visit type: Audiovisual      Date of Service: 2025    Care Navigator completing this form: Nayeli Bolton  PCP: Bela Holden MD  Care Team: Patient Care Team:  Bela Holden MD as PCP - General (Internal Medicine)  Nayeli Bolton as Care Manager  Isha Adams MD as Consulting Physician (Internal Medicine)     Patient: Michelle David  MRN: [id number]  : 1955  Age: 70 y.o.  Gender: female  Race: White  Ethnicity: Not  or /a    Objective: Initial/Annual Assessment for the GUIDE dementia care management program.    Michelle David is a 70 y.o. female who presents for Dementia Care Management initial visit. Spoke to Caregiver Karon Hutson (Other Family  ) and Nico (spouse)      CTN/SW Action Items:  Communicate medication changes to all prescribing physicians.   PROVIDE Education in regard to Cg's approach to administering medications   Medication Changes: Decrease Baclofen to BID and if tolerated try decreasing gabapentin to afternoon and evening. Decrease Lorazapam to 0.25 mg and increase Mirtazapine to 30 mg qhs. (Pt needs an Rx, would wait until the changes above.) Dr Hernandez suggests no crushing the metolrolol, rytary, and venlafaxine.     Next call scheduled for: 25 at 11:15 AM       Medications     Medication list reviewed and updated 2025     Total Active High Risk Medications:   6    baclofen Tab - 5 mg  gabapentin - 100 MG  HYDROcodone-acetaminophen - 5-325 mg  LORazepam - 0.5 MG  venlafaxine Cp24 - 150 MG  venlafaxine Tr24 - 75 mg    Review of patient's allergies indicates:   Allergen Reactions    Quetiapine Other (See Comments)     Syncope episode    Donepezil Diarrhea and Nausea And Vomiting           Advanced Care Planning     Advance Care Planning    Advance Care Planning       Goals of Care: Medication compliance       Surveys (reference Additional Document section for survey attachments)   The following surveys were documented in Flowsheets:    Monthly Tracking (Falls, UTIs, Hospital encounters)  CDR and/or FAST (assessed by provider)        Cognitive Screener           No data to display

## 2025-07-01 NOTE — LETTER
July 1, 2025        Bela Holdne MD  [address] [facility] [address]             [facility] [address]  [address]  [place] [address]  Phone: [phone]  Fax: [phone]   Patient: Michelle David   MR Number: [id number]   YOB: 1955   Date of Visit: 7/1/2025       Dear Dr. Holden, and Dr. Estrada,    Thank you for referring Michelle David to me for evaluation. Below are the relevant portions of my assessment and plan of care.    Decrease Baclofen to twice a day. If this is tolerated try decreasing the gabapentin to afternoon and evening.     2. Decrease Lorazapam to 0.25 mg     3. Increase Mirtazapine to 30 mg every night at bed time. Contact Dr. José Miguel Elliott via My Chart to get this prescription.     4. It is recommended not to crush the following medications: Metolrolol, Rytary, and Venlafaxine.     If you have questions, please do not hesitate to call me. I look forward to following Michelle along with you.    Sincerely,  Nayeli Bolton [facility]  Care Team Navigator   Brain Health and Neurology Department  [phone]          CC  Vi Estrada MD

## 2025-07-03 ENCOUNTER — PATIENT OUTREACH (OUTPATIENT)
Dept: NEUROLOGY | Facility: CLINIC | Age: 70
End: 2025-07-03
Payer: MEDICARE

## 2025-07-03 NOTE — PROGRESS NOTES
CTN reached out to Diogo David 2x once in the AM and once in the PM to connect to complete the ZBI scale. CTN was not able to reach Mr. David and left voicemail messages. CTN reached out to their hired caregiver, Karon to see if she could please help coordinate a time where the  is available to work with me for a few minutes so we can submit the application to Medicare as soon as possible.

## 2025-07-23 ENCOUNTER — PATIENT MESSAGE (OUTPATIENT)
Dept: NEUROLOGY | Facility: CLINIC | Age: 70
End: 2025-07-23

## 2025-07-23 ENCOUNTER — RESEARCH ENCOUNTER (OUTPATIENT)
Dept: NEUROLOGY | Facility: CLINIC | Age: 70
End: 2025-07-23
Payer: MEDICARE

## 2025-07-23 ENCOUNTER — PATIENT OUTREACH (OUTPATIENT)
Dept: NEUROLOGY | Facility: CLINIC | Age: 70
End: 2025-07-23

## 2025-07-23 DIAGNOSIS — F02.C2: Primary | ICD-10-CM

## 2025-07-23 DIAGNOSIS — G20.A1: Primary | ICD-10-CM

## 2025-07-23 PROCEDURE — G0520 PR MGMT NEW PT-CAREGIVER DYAD, WITH DEMENTIA, MODERATE COMPLEXITY: HCPCS | Mod: 93,,, | Performed by: STUDENT IN AN ORGANIZED HEALTH CARE EDUCATION/TRAINING PROGRAM

## 2025-07-23 NOTE — PROGRESS NOTES
"Dementia Care Program - GUIDE  Care Navigator - Monthly Follow Up      Virtual Visit - Telehealth  The patient location is: Home  The chief complaint leading to consultation is: GUIDE Model Program  Visit type: Audio Only       Date of Service: 2025  Enrollment date: 25  Program Status: Active  CMS Complexity Tier: Moderate Complexity Dy[place] - First 6M -   Was Respite Approved? Yes    Care Navigator completing this form: Nayeli Bolton  PCP: Bela Holden MD  Care Team: Patient Care Team:  Bela Holden MD as PCP - General (Internal Medicine)  Nayeli Bolton as Care Manager  Isha Adams MD as Consulting Physician (Internal Medicine)     Patient: Michelle David  MRN: [id number]  : 1955  Age: 70 y.o.  Gender: female  Race: White  Ethnicity: Not  or /a    Visit: Care Navigator/Social Work follow up as part of the GUIDE dementia care management program.        ASSESSMENT & CARE PLAN     Michelle Amador" was seen today for dementia.    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 Cg reports that the pt has been staying up later than usual. cg is unsure if the pt is just anxious because there are new people around. Cg reports some hallucinatins but nothign out of the norm. Cg reports that the pt sees children and people. CTN provided some dementia education about hallucinations. Cg reports pt is 118lbs no major weight gain or loss. (eats fair to well with assitence) cg reports that the pt is getting enough liquids.   Prevention & Safety Falls;Urinary Tract Infections;Pain;Hospital and ED Utilization   Referrals Sitter Services "   Comments Cg reports that the pt and  had live in sitters but they are not working out. they are trying to work with another company to get some people to live in with them. Cg states that they had a conversation about hospice but the pt is not yet hospice appropriote.         CTN/SW Action Items: Cg knows how to reach the CTN if they need additional emotional support or dementia education. CTN will send a referral for respite care, and a home assessment.     Plan: Monthly follow up for dementia care management as part of the GUIDE Model.    Next visit scheduled for: 08/21/25 at 11:15 AM. Caregiver knows how to reach CTN/SW and is encouraged to do so before the next scheduled call, if needed.    SUBJECTIVE     Cg is interested in support groups but only if virtual. CTN offered to send some resources home but the cg declined for now and will be in touch if he changes his mind.     MONTHLY TRACKING         7/23/2025 7/1/2025 5/22/2025   DC MONTHLY TRACKING   Falls 0 1 0   UTIs 0 1 0   ED Visits 0 1    Hospital Encounters 0 0 0           Medication List       Current Outpatient Medications:     acetaminophen (TYLENOL) 500 MG tablet, Take 1 tablet by mouth every morning., Disp: , Rfl:     ACETAMINOPHEN ORAL, Take 1 tablet by mouth once daily., Disp: , Rfl:     amLODIPine (NORVASC) 2.5 MG tablet, Take 2.5 mg by mouth once daily., Disp: , Rfl:     carbidopa-levodopa (RYTARY) 23.75-95 mg CpSR, Take 2 capsules by mouth 5 (five) times daily AND 1 capsule every evening. Take two caps 5 times per day at 0830, 11 am, 1330, 1600, 1830 AND 1 cap  at 2100., Disp: 330 capsule, Rfl: 11    carbidopa-levodopa (RYTARY) 36. mg CpSR, Take 1 capsule at 6 am, Disp: 30 capsule, Rfl: 11    celecoxib (CELEBREX) 100 MG capsule, Take 100 mg by mouth 2 (two) times a day., Disp: , Rfl:     cholecalciferol, vitamin D3, 125 mcg (5,000 unit) Tab, Take 1 tablet by mouth every morning., Disp: , Rfl:     cranberry fruit concentrate (AZO  CRANBERRY ORAL), Take by mouth 2 (two) times a day., Disp: , Rfl:     FA/mv,Ca,iron,min/lycopene/lut (MULTIVITAL ORAL), Take by mouth once daily., Disp: , Rfl:     gabapentin (NEURONTIN) 100 MG capsule, Take 100 mg by mouth 3 (three) times daily., Disp: , Rfl:     Lactobacillus acidophilus (PROBIOTIC) 10 billion cell Cap, Take 50,000 Units by mouth once daily., Disp: , Rfl:     LORazepam (ATIVAN) 0.5 MG tablet, Take 1 mg by mouth every evening., Disp: , Rfl:     lubiprostone (AMITIZA) 24 MCG Cap, Take 24 mcg by mouth 2 (two) times daily with meals., Disp: , Rfl:     metoprolol succinate (TOPROL-XL) 50 MG 24 hr tablet, Take 1 tablet by mouth once daily., Disp: , Rfl:     mirtazapine (REMERON) 15 MG tablet, Take 15 mg by mouth every evening., Disp: , Rfl:     nitrofurantoin, macrocrystal-monohydrate, (MACROBID) 100 MG capsule, Take 100 mg by mouth 2 (two) times daily., Disp: , Rfl:     polyethylene glycol (GLYCOLAX) 17 gram/dose powder, Take 17 g by mouth once daily., Disp: , Rfl:     rasagiline (AZILECT) 1 mg Tab, Take 1 tablet (1 mg total) by mouth every evening., Disp: 30 tablet, Rfl: 12    rivastigmine tartrate (EXELON) 1.5 MG capsule, Take 1 capsule (1.5 mg total) by mouth 2 (two) times daily., Disp: 60 capsule, Rfl: 11    venlafaxine 75 mg TR24, , Disp: , Rfl:

## 2025-07-24 ENCOUNTER — PATIENT OUTREACH (OUTPATIENT)
Dept: NEUROLOGY | Facility: CLINIC | Age: 70
End: 2025-07-24
Payer: MEDICARE

## 2025-07-24 NOTE — PROGRESS NOTES
CTN received an in coming call from Karon Hutson, one of the pt's caregivers. Cg reports that she spoke with the pt's daughter who would like to move forward with respite through The [facility] Model Program. CTN let the cg know that the referral was sent and someone will be in touch in the next week.

## 2025-07-31 ENCOUNTER — PATIENT OUTREACH (OUTPATIENT)
Dept: NEUROLOGY | Facility: CLINIC | Age: 70
End: 2025-07-31
Payer: MEDICARE

## 2025-07-31 DIAGNOSIS — G20.A1: Primary | ICD-10-CM

## 2025-07-31 DIAGNOSIS — F02.C2: Primary | ICD-10-CM

## 2025-07-31 PROCEDURE — G0529 PR IN HOME RESPITE CARE, 4 HR UNIT: HCPCS | Mod: ,,, | Performed by: STUDENT IN AN ORGANIZED HEALTH CARE EDUCATION/TRAINING PROGRAM

## 2025-08-04 ENCOUNTER — PATIENT OUTREACH (OUTPATIENT)
Dept: RESEARCH | Facility: HOSPITAL | Age: 70
End: 2025-08-04
Payer: MEDICARE

## 2025-08-04 ENCOUNTER — PATIENT MESSAGE (OUTPATIENT)
Dept: NEUROLOGY | Facility: CLINIC | Age: 70
End: 2025-08-04
Payer: MEDICARE

## 2025-08-04 NOTE — PROGRESS NOTES
CTN received and in coming in basket message from the cg, Karon Hutson. CTN called the caregiver to review the pt's most current medication list and let the cg know that I would be back in touch after touching base with the [facility] Model Program's clinical staff.       Rytary 36.25/145 at 6 AM   Rytary 23.75/95 1 capsule at 8:30 AM, 11 AM, 1 PM, 4 PM 6:30 PM and 9 PM  Celebrex 100 mg 8:30 AM and 9 PM  Metropolol 50 mg 8:30 AM  Venlofazine ER 75 mg  at 8:30 AM  Rivostagmine 1.5 mg 8:30 AM and 9 PM  Gabapentin 100 mg 1:30 PM and 9 PM  Amlodipine 2.5 mg 4 pm   Rasagiline 1 mg 9 PM  Mirtazipine 15 mg 9 PM  Baclofen 5 mg 9 PM  Lorazapan 0.5 mg 9 PM   Amitiza 24 mcg's 8:30 AM and 9 PM  8:30 AM multi vitamin, pro biotic and vitamin D

## 2025-08-07 ENCOUNTER — PATIENT MESSAGE (OUTPATIENT)
Dept: RESEARCH | Facility: HOSPITAL | Age: 70
End: 2025-08-07
Payer: MEDICARE

## 2025-08-19 ENCOUNTER — PATIENT OUTREACH (OUTPATIENT)
Dept: NEUROLOGY | Facility: CLINIC | Age: 70
End: 2025-08-19
Payer: MEDICARE

## 2025-08-30 ENCOUNTER — PATIENT OUTREACH (OUTPATIENT)
Dept: NEUROLOGY | Facility: CLINIC | Age: 70
End: 2025-08-30
Payer: MEDICARE

## 2025-08-30 DIAGNOSIS — G20.A1: Primary | ICD-10-CM

## 2025-08-30 DIAGNOSIS — F02.C2: Primary | ICD-10-CM
